# Patient Record
Sex: MALE | Race: BLACK OR AFRICAN AMERICAN | NOT HISPANIC OR LATINO | Employment: UNEMPLOYED | URBAN - METROPOLITAN AREA
[De-identification: names, ages, dates, MRNs, and addresses within clinical notes are randomized per-mention and may not be internally consistent; named-entity substitution may affect disease eponyms.]

---

## 2018-09-02 ENCOUNTER — HOSPITAL ENCOUNTER (EMERGENCY)
Age: 48
Discharge: HOME | End: 2018-09-02

## 2018-09-02 VITALS
SYSTOLIC BLOOD PRESSURE: 133 MMHG | TEMPERATURE: 98.3 F | WEIGHT: 145 LBS | DIASTOLIC BLOOD PRESSURE: 80 MMHG | OXYGEN SATURATION: 98 % | BODY MASS INDEX: 23.3 KG/M2 | HEIGHT: 66 IN | HEART RATE: 89 BPM | RESPIRATION RATE: 18 BRPM

## 2018-09-02 DIAGNOSIS — K08.89 PAIN, DENTAL: Primary | ICD-10-CM

## 2018-09-02 PROCEDURE — 99284 EMERGENCY DEPT VISIT MOD MDM: CPT | Performed by: NURSE PRACTITIONER

## 2018-09-02 PROCEDURE — 10004651 HB RX, NO CHARGE ITEM: Performed by: NURSE PRACTITIONER

## 2018-09-02 PROCEDURE — 99282 EMERGENCY DEPT VISIT SF MDM: CPT

## 2018-09-02 RX ORDER — PENICILLIN V POTASSIUM 500 MG/1
500 TABLET ORAL 4 TIMES DAILY
Qty: 28 TABLET | Refills: 0 | Status: SHIPPED | OUTPATIENT
Start: 2018-09-02 | End: 2018-09-09

## 2018-09-02 RX ORDER — ACETAMINOPHEN 500 MG
1000 TABLET ORAL ONCE
Status: COMPLETED | OUTPATIENT
Start: 2018-09-02 | End: 2018-09-02

## 2018-09-02 RX ADMIN — ACETAMINOPHEN 1000 MG: 500 TABLET ORAL at 14:10

## 2018-09-02 ASSESSMENT — PAIN SCALES - GENERAL: PAINLEVEL_OUTOF10: 8

## 2018-12-30 ENCOUNTER — HOSPITAL ENCOUNTER (EMERGENCY)
Age: 48
Discharge: HOME | End: 2018-12-30

## 2018-12-30 VITALS
SYSTOLIC BLOOD PRESSURE: 110 MMHG | HEART RATE: 96 BPM | TEMPERATURE: 98 F | DIASTOLIC BLOOD PRESSURE: 74 MMHG | RESPIRATION RATE: 14 BRPM

## 2018-12-30 DIAGNOSIS — H00.011 HORDEOLUM EXTERNUM OF RIGHT UPPER EYELID: Primary | ICD-10-CM

## 2018-12-30 PROCEDURE — 99283 EMERGENCY DEPT VISIT LOW MDM: CPT

## 2018-12-30 PROCEDURE — 99284 EMERGENCY DEPT VISIT MOD MDM: CPT | Performed by: PHYSICIAN ASSISTANT

## 2018-12-30 PROCEDURE — 10002803 HB RX 637: Performed by: PHYSICIAN ASSISTANT

## 2018-12-30 RX ORDER — ERYTHROMYCIN 5 MG/G
OINTMENT OPHTHALMIC ONCE
Status: COMPLETED | OUTPATIENT
Start: 2018-12-30 | End: 2018-12-30

## 2018-12-30 RX ORDER — ERYTHROMYCIN 5 MG/G
OINTMENT OPHTHALMIC
Qty: 3.5 G | Refills: 0 | Status: SHIPPED | OUTPATIENT
Start: 2018-12-30

## 2018-12-30 RX ORDER — IBUPROFEN 600 MG/1
600 TABLET ORAL ONCE
Status: COMPLETED | OUTPATIENT
Start: 2018-12-30 | End: 2018-12-30

## 2018-12-30 RX ORDER — IBUPROFEN 600 MG/1
600 TABLET ORAL EVERY 6 HOURS PRN
Qty: 30 TABLET | Refills: 0 | OUTPATIENT
Start: 2018-12-30 | End: 2020-02-04

## 2018-12-30 RX ADMIN — ERYTHROMYCIN: 5 OINTMENT OPHTHALMIC at 11:49

## 2018-12-30 RX ADMIN — IBUPROFEN 600 MG: 600 TABLET, FILM COATED ORAL at 11:49

## 2020-02-04 ENCOUNTER — APPOINTMENT (OUTPATIENT)
Dept: GENERAL RADIOLOGY | Age: 50
End: 2020-02-04

## 2020-02-04 ENCOUNTER — HOSPITAL ENCOUNTER (EMERGENCY)
Age: 50
Discharge: HOME | End: 2020-02-04

## 2020-02-04 VITALS
SYSTOLIC BLOOD PRESSURE: 130 MMHG | WEIGHT: 143.3 LBS | DIASTOLIC BLOOD PRESSURE: 90 MMHG | BODY MASS INDEX: 23.13 KG/M2 | OXYGEN SATURATION: 100 % | TEMPERATURE: 98.6 F | HEART RATE: 88 BPM | RESPIRATION RATE: 16 BRPM

## 2020-02-04 DIAGNOSIS — M54.6 BACK PAIN OF THORACOLUMBAR REGION: ICD-10-CM

## 2020-02-04 DIAGNOSIS — M54.50 BACK PAIN OF THORACOLUMBAR REGION: ICD-10-CM

## 2020-02-04 DIAGNOSIS — S22.31XA CLOSED FRACTURE OF ONE RIB OF RIGHT SIDE, INITIAL ENCOUNTER: Primary | ICD-10-CM

## 2020-02-04 PROCEDURE — 99284 EMERGENCY DEPT VISIT MOD MDM: CPT | Performed by: PHYSICIAN ASSISTANT

## 2020-02-04 PROCEDURE — 10002803 HB RX 637: Performed by: PHYSICIAN ASSISTANT

## 2020-02-04 PROCEDURE — 99283 EMERGENCY DEPT VISIT LOW MDM: CPT

## 2020-02-04 PROCEDURE — 71101 X-RAY EXAM UNILAT RIBS/CHEST: CPT

## 2020-02-04 PROCEDURE — 71101 X-RAY EXAM UNILAT RIBS/CHEST: CPT | Performed by: RADIOLOGY

## 2020-02-04 RX ORDER — LIDOCAINE 4 G/G
1 PATCH TOPICAL DAILY
Status: DISCONTINUED | OUTPATIENT
Start: 2020-02-04 | End: 2020-02-04 | Stop reason: HOSPADM

## 2020-02-04 RX ORDER — HYDROCODONE BITARTRATE AND ACETAMINOPHEN 5; 325 MG/1; MG/1
1 TABLET ORAL EVERY 6 HOURS PRN
Qty: 10 TABLET | Refills: 0 | Status: SHIPPED | OUTPATIENT
Start: 2020-02-04

## 2020-02-04 RX ORDER — IBUPROFEN 600 MG/1
600 TABLET ORAL EVERY 6 HOURS PRN
Qty: 20 TABLET | Refills: 0 | Status: SHIPPED | OUTPATIENT
Start: 2020-02-04

## 2020-02-04 RX ORDER — LIDOCAINE 50 MG/G
1 PATCH TOPICAL EVERY 24 HOURS
Qty: 15 PATCH | Refills: 0 | Status: SHIPPED | OUTPATIENT
Start: 2020-02-04

## 2020-02-04 RX ORDER — IBUPROFEN 600 MG/1
600 TABLET ORAL ONCE
Status: COMPLETED | OUTPATIENT
Start: 2020-02-04 | End: 2020-02-04

## 2020-02-04 RX ADMIN — IBUPROFEN 600 MG: 600 TABLET ORAL at 10:47

## 2020-02-04 RX ADMIN — LIDOCAINE 1 PATCH: 246 PATCH TOPICAL at 10:48

## 2020-02-04 ASSESSMENT — PAIN DESCRIPTION - PAIN TYPE
TYPE: ACUTE PAIN

## 2020-02-04 ASSESSMENT — PAIN SCALES - GENERAL
PAINLEVEL_OUTOF10: 10
PAINLEVEL_OUTOF10: 10
PAINLEVEL_OUTOF10: 8
PAINLEVEL_OUTOF10: 10

## 2020-02-21 ENCOUNTER — HOSPITAL ENCOUNTER (EMERGENCY)
Age: 50
Discharge: HOME | End: 2020-02-21
Attending: EMERGENCY MEDICINE

## 2020-02-21 VITALS
DIASTOLIC BLOOD PRESSURE: 93 MMHG | TEMPERATURE: 98.8 F | SYSTOLIC BLOOD PRESSURE: 128 MMHG | HEART RATE: 87 BPM | OXYGEN SATURATION: 99 % | RESPIRATION RATE: 16 BRPM

## 2020-02-21 DIAGNOSIS — K08.89 PAIN, DENTAL: Primary | ICD-10-CM

## 2020-02-21 PROCEDURE — 10002803 HB RX 637: Performed by: NURSE PRACTITIONER

## 2020-02-21 PROCEDURE — 99282 EMERGENCY DEPT VISIT SF MDM: CPT

## 2020-02-21 PROCEDURE — 99284 EMERGENCY DEPT VISIT MOD MDM: CPT | Performed by: NURSE PRACTITIONER

## 2020-02-21 RX ORDER — TRAMADOL HYDROCHLORIDE 50 MG/1
50 TABLET ORAL ONCE
Status: COMPLETED | OUTPATIENT
Start: 2020-02-21 | End: 2020-02-21

## 2020-02-21 RX ORDER — PENICILLIN V POTASSIUM 500 MG/1
500 TABLET ORAL 4 TIMES DAILY
Qty: 28 TABLET | Refills: 0 | Status: SHIPPED | OUTPATIENT
Start: 2020-02-21 | End: 2020-02-28

## 2020-02-21 RX ORDER — PENICILLIN V POTASSIUM 250 MG/1
500 TABLET ORAL ONCE
Status: COMPLETED | OUTPATIENT
Start: 2020-02-21 | End: 2020-02-21

## 2020-02-21 RX ADMIN — TRAMADOL HYDROCHLORIDE 50 MG: 50 TABLET, COATED ORAL at 02:34

## 2020-02-21 RX ADMIN — PENICILLIN V POTASSIUM 500 MG: 250 TABLET, FILM COATED ORAL at 02:34

## 2020-02-21 ASSESSMENT — PAIN DESCRIPTION - PAIN TYPE
TYPE: ACUTE PAIN
TYPE: ACUTE PAIN

## 2020-02-21 ASSESSMENT — PAIN SCALES - GENERAL
PAINLEVEL_OUTOF10: 10
PAINLEVEL_OUTOF10: 10

## 2021-07-02 ENCOUNTER — APPOINTMENT (OUTPATIENT)
Dept: GENERAL RADIOLOGY | Age: 51
End: 2021-07-02

## 2021-07-02 ENCOUNTER — HOSPITAL ENCOUNTER (EMERGENCY)
Age: 51
Discharge: HOME | End: 2021-07-02

## 2021-07-02 VITALS
SYSTOLIC BLOOD PRESSURE: 108 MMHG | DIASTOLIC BLOOD PRESSURE: 78 MMHG | TEMPERATURE: 97.9 F | OXYGEN SATURATION: 98 % | RESPIRATION RATE: 18 BRPM | HEART RATE: 91 BPM

## 2021-07-02 DIAGNOSIS — S80.02XA CONTUSION OF LEFT KNEE, INITIAL ENCOUNTER: Primary | ICD-10-CM

## 2021-07-02 PROCEDURE — 99284 EMERGENCY DEPT VISIT MOD MDM: CPT | Performed by: PHYSICIAN ASSISTANT

## 2021-07-02 PROCEDURE — 10002803 HB RX 637: Performed by: PHYSICIAN ASSISTANT

## 2021-07-02 PROCEDURE — 73562 X-RAY EXAM OF KNEE 3: CPT

## 2021-07-02 PROCEDURE — 73562 X-RAY EXAM OF KNEE 3: CPT | Performed by: RADIOLOGY

## 2021-07-02 PROCEDURE — 99283 EMERGENCY DEPT VISIT LOW MDM: CPT

## 2021-07-02 RX ORDER — IBUPROFEN 400 MG/1
800 TABLET ORAL ONCE
Status: COMPLETED | OUTPATIENT
Start: 2021-07-02 | End: 2021-07-02

## 2021-07-02 RX ADMIN — IBUPROFEN 800 MG: 400 TABLET ORAL at 19:03

## 2021-07-02 ASSESSMENT — PAIN SCALES - GENERAL: PAINLEVEL_OUTOF10: 9

## 2021-09-15 ENCOUNTER — ANESTHESIA (OUTPATIENT)
Dept: PERIOP | Facility: HOSPITAL | Age: 51
End: 2021-09-15

## 2021-09-15 ENCOUNTER — ANESTHESIA EVENT (OUTPATIENT)
Dept: PERIOP | Facility: HOSPITAL | Age: 51
End: 2021-09-15

## 2021-09-15 ENCOUNTER — APPOINTMENT (OUTPATIENT)
Dept: CT IMAGING | Facility: HOSPITAL | Age: 51
End: 2021-09-15

## 2021-09-15 ENCOUNTER — HOSPITAL ENCOUNTER (OUTPATIENT)
Facility: HOSPITAL | Age: 51
Discharge: HOME | End: 2021-09-17
Attending: EMERGENCY MEDICINE | Admitting: SURGERY

## 2021-09-15 DIAGNOSIS — K35.80 ACUTE APPENDICITIS, UNSPECIFIED ACUTE APPENDICITIS TYPE: Primary | ICD-10-CM

## 2021-09-15 DIAGNOSIS — K35.20 ACUTE APPENDICITIS WITH PERFORATION AND GENERALIZED PERITONITIS, WITHOUT ABSCESS OR GANGRENE: ICD-10-CM

## 2021-09-15 DIAGNOSIS — K35.33 ACUTE APPENDICITIS WITH APPENDICEAL ABSCESS: ICD-10-CM

## 2021-09-15 LAB
ALBUMIN SERPL-MCNC: 4.3 G/DL (ref 3.5–5.2)
ALBUMIN/GLOB SERPL: 1.1 G/DL
ALP SERPL-CCNC: 67 U/L (ref 39–117)
ALT SERPL W P-5'-P-CCNC: 15 U/L (ref 1–41)
ANION GAP SERPL CALCULATED.3IONS-SCNC: 14.6 MMOL/L (ref 5–15)
AST SERPL-CCNC: 14 U/L (ref 1–40)
BACTERIA UR QL AUTO: ABNORMAL /HPF
BASOPHILS # BLD AUTO: 0.04 10*3/MM3 (ref 0–0.2)
BASOPHILS NFR BLD AUTO: 0.2 % (ref 0–1.5)
BILIRUB SERPL-MCNC: 0.6 MG/DL (ref 0–1.2)
BILIRUB UR QL STRIP: ABNORMAL
BUN SERPL-MCNC: 15 MG/DL (ref 6–20)
BUN/CREAT SERPL: 13 (ref 7–25)
CALCIUM SPEC-SCNC: 9.6 MG/DL (ref 8.6–10.5)
CHLORIDE SERPL-SCNC: 102 MMOL/L (ref 98–107)
CLARITY UR: CLEAR
CO2 SERPL-SCNC: 23.4 MMOL/L (ref 22–29)
COLOR UR: YELLOW
CREAT SERPL-MCNC: 1.15 MG/DL (ref 0.76–1.27)
DEPRECATED RDW RBC AUTO: 42.8 FL (ref 37–54)
EOSINOPHIL # BLD AUTO: 0 10*3/MM3 (ref 0–0.4)
EOSINOPHIL NFR BLD AUTO: 0 % (ref 0.3–6.2)
ERYTHROCYTE [DISTWIDTH] IN BLOOD BY AUTOMATED COUNT: 12.6 % (ref 12.3–15.4)
GFR SERPL CREATININE-BSD FRML MDRD: 67 ML/MIN/1.73
GLOBULIN UR ELPH-MCNC: 4 GM/DL
GLUCOSE SERPL-MCNC: 84 MG/DL (ref 65–99)
GLUCOSE UR STRIP-MCNC: NEGATIVE MG/DL
HCT VFR BLD AUTO: 45.6 % (ref 37.5–51)
HGB BLD-MCNC: 14.4 G/DL (ref 13–17.7)
HGB UR QL STRIP.AUTO: ABNORMAL
HOLD SPECIMEN: NORMAL
HOLD SPECIMEN: NORMAL
HYALINE CASTS UR QL AUTO: ABNORMAL /LPF
IMM GRANULOCYTES # BLD AUTO: 0.09 10*3/MM3 (ref 0–0.05)
IMM GRANULOCYTES NFR BLD AUTO: 0.4 % (ref 0–0.5)
KETONES UR QL STRIP: ABNORMAL
LEUKOCYTE ESTERASE UR QL STRIP.AUTO: NEGATIVE
LIPASE SERPL-CCNC: 13 U/L (ref 13–60)
LYMPHOCYTES # BLD AUTO: 1.28 10*3/MM3 (ref 0.7–3.1)
LYMPHOCYTES NFR BLD AUTO: 6.3 % (ref 19.6–45.3)
MCH RBC QN AUTO: 29.2 PG (ref 26.6–33)
MCHC RBC AUTO-ENTMCNC: 31.6 G/DL (ref 31.5–35.7)
MCV RBC AUTO: 92.5 FL (ref 79–97)
MONOCYTES # BLD AUTO: 1.15 10*3/MM3 (ref 0.1–0.9)
MONOCYTES NFR BLD AUTO: 5.7 % (ref 5–12)
NEUTROPHILS NFR BLD AUTO: 17.78 10*3/MM3 (ref 1.7–7)
NEUTROPHILS NFR BLD AUTO: 87.4 % (ref 42.7–76)
NITRITE UR QL STRIP: NEGATIVE
NRBC BLD AUTO-RTO: 0 /100 WBC (ref 0–0.2)
PH UR STRIP.AUTO: 5.5 [PH] (ref 5–8)
PLATELET # BLD AUTO: 240 10*3/MM3 (ref 140–450)
PMV BLD AUTO: 10.2 FL (ref 6–12)
POTASSIUM SERPL-SCNC: 4.1 MMOL/L (ref 3.5–5.2)
PROT SERPL-MCNC: 8.3 G/DL (ref 6–8.5)
PROT UR QL STRIP: NEGATIVE
RBC # BLD AUTO: 4.93 10*6/MM3 (ref 4.14–5.8)
RBC # UR: ABNORMAL /HPF
REF LAB TEST METHOD: ABNORMAL
SODIUM SERPL-SCNC: 140 MMOL/L (ref 136–145)
SP GR UR STRIP: 1.01 (ref 1–1.03)
SQUAMOUS #/AREA URNS HPF: ABNORMAL /HPF
TROPONIN T SERPL-MCNC: <0.01 NG/ML (ref 0–0.03)
UROBILINOGEN UR QL STRIP: ABNORMAL
WBC # BLD AUTO: 20.34 10*3/MM3 (ref 3.4–10.8)
WBC UR QL AUTO: ABNORMAL /HPF
WHOLE BLOOD HOLD SPECIMEN: NORMAL
WHOLE BLOOD HOLD SPECIMEN: NORMAL

## 2021-09-15 PROCEDURE — 25010000002 HYDROMORPHONE PER 4 MG: Performed by: ANESTHESIOLOGY

## 2021-09-15 PROCEDURE — 99284 EMERGENCY DEPT VISIT MOD MDM: CPT

## 2021-09-15 PROCEDURE — C1889 IMPLANT/INSERT DEVICE, NOC: HCPCS | Performed by: SURGERY

## 2021-09-15 PROCEDURE — 25010000002 FENTANYL CITRATE (PF) 50 MCG/ML SOLUTION: Performed by: ANESTHESIOLOGY

## 2021-09-15 PROCEDURE — 25010000002 ONDANSETRON PER 1 MG: Performed by: ANESTHESIOLOGY

## 2021-09-15 PROCEDURE — 87635 SARS-COV-2 COVID-19 AMP PRB: CPT | Performed by: SURGERY

## 2021-09-15 PROCEDURE — 74177 CT ABD & PELVIS W/CONTRAST: CPT

## 2021-09-15 PROCEDURE — 25010000002 DEXAMETHASONE PER 1 MG: Performed by: ANESTHESIOLOGY

## 2021-09-15 PROCEDURE — 85025 COMPLETE CBC W/AUTO DIFF WBC: CPT | Performed by: EMERGENCY MEDICINE

## 2021-09-15 PROCEDURE — 44970 LAPAROSCOPY APPENDECTOMY: CPT | Performed by: SURGERY

## 2021-09-15 PROCEDURE — 25010000002 NEOSTIGMINE 10 MG/10ML SOLUTION: Performed by: ANESTHESIOLOGY

## 2021-09-15 PROCEDURE — C9803 HOPD COVID-19 SPEC COLLECT: HCPCS

## 2021-09-15 PROCEDURE — 99225 PR SBSQ OBSERVATION CARE/DAY 25 MINUTES: CPT | Performed by: SURGERY

## 2021-09-15 PROCEDURE — 83690 ASSAY OF LIPASE: CPT | Performed by: EMERGENCY MEDICINE

## 2021-09-15 PROCEDURE — 0 IOPAMIDOL PER 1 ML: Performed by: EMERGENCY MEDICINE

## 2021-09-15 PROCEDURE — 36415 COLL VENOUS BLD VENIPUNCTURE: CPT | Performed by: EMERGENCY MEDICINE

## 2021-09-15 PROCEDURE — G0378 HOSPITAL OBSERVATION PER HR: HCPCS

## 2021-09-15 PROCEDURE — 84484 ASSAY OF TROPONIN QUANT: CPT | Performed by: EMERGENCY MEDICINE

## 2021-09-15 PROCEDURE — 88304 TISSUE EXAM BY PATHOLOGIST: CPT | Performed by: SURGERY

## 2021-09-15 PROCEDURE — 25010000002 MIDAZOLAM PER 1MG: Performed by: ANESTHESIOLOGY

## 2021-09-15 PROCEDURE — 25010000002 PROPOFOL 10 MG/ML EMULSION: Performed by: ANESTHESIOLOGY

## 2021-09-15 PROCEDURE — 81001 URINALYSIS AUTO W/SCOPE: CPT | Performed by: EMERGENCY MEDICINE

## 2021-09-15 PROCEDURE — 80053 COMPREHEN METABOLIC PANEL: CPT | Performed by: EMERGENCY MEDICINE

## 2021-09-15 PROCEDURE — 25010000002 CEFOXITIN PER 1 G

## 2021-09-15 PROCEDURE — 25010000002 KETOROLAC TROMETHAMINE PER 15 MG: Performed by: ANESTHESIOLOGY

## 2021-09-15 DEVICE — ENDOPATH ETS45 2.5MM RELOADS (VASCULAR/THIN)
Type: IMPLANTABLE DEVICE | Site: ABDOMEN | Status: FUNCTIONAL
Brand: ENDOPATH

## 2021-09-15 RX ORDER — MIDAZOLAM HYDROCHLORIDE 2 MG/2ML
INJECTION, SOLUTION INTRAMUSCULAR; INTRAVENOUS AS NEEDED
Status: DISCONTINUED | OUTPATIENT
Start: 2021-09-15 | End: 2021-09-15 | Stop reason: SURG

## 2021-09-15 RX ORDER — ONDANSETRON 2 MG/ML
4 INJECTION INTRAMUSCULAR; INTRAVENOUS EVERY 6 HOURS PRN
Status: CANCELLED | OUTPATIENT
Start: 2021-09-15

## 2021-09-15 RX ORDER — GLYCOPYRROLATE 0.2 MG/ML
INJECTION INTRAMUSCULAR; INTRAVENOUS AS NEEDED
Status: DISCONTINUED | OUTPATIENT
Start: 2021-09-15 | End: 2021-09-15 | Stop reason: SURG

## 2021-09-15 RX ORDER — ONDANSETRON 2 MG/ML
4 INJECTION INTRAMUSCULAR; INTRAVENOUS ONCE AS NEEDED
Status: DISCONTINUED | OUTPATIENT
Start: 2021-09-15 | End: 2021-09-16 | Stop reason: HOSPADM

## 2021-09-15 RX ORDER — SODIUM CHLORIDE, SODIUM LACTATE, POTASSIUM CHLORIDE, CALCIUM CHLORIDE 600; 310; 30; 20 MG/100ML; MG/100ML; MG/100ML; MG/100ML
70 INJECTION, SOLUTION INTRAVENOUS CONTINUOUS
Status: DISCONTINUED | OUTPATIENT
Start: 2021-09-15 | End: 2021-09-16

## 2021-09-15 RX ORDER — PROMETHAZINE HYDROCHLORIDE 25 MG/1
25 SUPPOSITORY RECTAL ONCE AS NEEDED
Status: DISCONTINUED | OUTPATIENT
Start: 2021-09-15 | End: 2021-09-16 | Stop reason: HOSPADM

## 2021-09-15 RX ORDER — ACETAMINOPHEN 160 MG/5ML
650 SOLUTION ORAL ONCE
Status: DISCONTINUED | OUTPATIENT
Start: 2021-09-15 | End: 2021-09-15

## 2021-09-15 RX ORDER — SODIUM CHLORIDE, SODIUM LACTATE, POTASSIUM CHLORIDE, CALCIUM CHLORIDE 600; 310; 30; 20 MG/100ML; MG/100ML; MG/100ML; MG/100ML
9 INJECTION, SOLUTION INTRAVENOUS CONTINUOUS PRN
Status: DISCONTINUED | OUTPATIENT
Start: 2021-09-15 | End: 2021-09-16 | Stop reason: HOSPADM

## 2021-09-15 RX ORDER — DEXAMETHASONE SODIUM PHOSPHATE 4 MG/ML
INJECTION, SOLUTION INTRA-ARTICULAR; INTRALESIONAL; INTRAMUSCULAR; INTRAVENOUS; SOFT TISSUE AS NEEDED
Status: DISCONTINUED | OUTPATIENT
Start: 2021-09-15 | End: 2021-09-15 | Stop reason: SURG

## 2021-09-15 RX ORDER — LIDOCAINE HYDROCHLORIDE 20 MG/ML
INJECTION, SOLUTION INFILTRATION; PERINEURAL AS NEEDED
Status: DISCONTINUED | OUTPATIENT
Start: 2021-09-15 | End: 2021-09-15 | Stop reason: SURG

## 2021-09-15 RX ORDER — DICLOFENAC SODIUM 75 MG/1
50 TABLET, DELAYED RELEASE ORAL DAILY
COMMUNITY
End: 2021-09-27

## 2021-09-15 RX ORDER — MEPERIDINE HYDROCHLORIDE 25 MG/ML
12.5 INJECTION INTRAMUSCULAR; INTRAVENOUS; SUBCUTANEOUS
Status: DISCONTINUED | OUTPATIENT
Start: 2021-09-15 | End: 2021-09-16 | Stop reason: HOSPADM

## 2021-09-15 RX ORDER — SODIUM CHLORIDE 0.9 % (FLUSH) 0.9 %
10 SYRINGE (ML) INJECTION AS NEEDED
Status: DISCONTINUED | OUTPATIENT
Start: 2021-09-15 | End: 2021-09-15 | Stop reason: HOSPADM

## 2021-09-15 RX ORDER — PROPOFOL 10 MG/ML
VIAL (ML) INTRAVENOUS AS NEEDED
Status: DISCONTINUED | OUTPATIENT
Start: 2021-09-15 | End: 2021-09-15 | Stop reason: SURG

## 2021-09-15 RX ORDER — CLINDAMYCIN HYDROCHLORIDE 300 MG/1
300 CAPSULE ORAL 4 TIMES DAILY
Qty: 40 CAPSULE | Refills: 0 | Status: SHIPPED | OUTPATIENT
Start: 2021-09-15 | End: 2021-09-15

## 2021-09-15 RX ORDER — FENTANYL CITRATE 50 UG/ML
INJECTION, SOLUTION INTRAMUSCULAR; INTRAVENOUS AS NEEDED
Status: DISCONTINUED | OUTPATIENT
Start: 2021-09-15 | End: 2021-09-15 | Stop reason: SURG

## 2021-09-15 RX ORDER — SODIUM CHLORIDE 0.9 % (FLUSH) 0.9 %
10 SYRINGE (ML) INJECTION EVERY 12 HOURS SCHEDULED
Status: DISCONTINUED | OUTPATIENT
Start: 2021-09-15 | End: 2021-09-15 | Stop reason: HOSPADM

## 2021-09-15 RX ORDER — OXYCODONE HYDROCHLORIDE 5 MG/1
5 TABLET ORAL
Status: DISCONTINUED | OUTPATIENT
Start: 2021-09-15 | End: 2021-09-16 | Stop reason: HOSPADM

## 2021-09-15 RX ORDER — SODIUM CHLORIDE 0.9 % (FLUSH) 0.9 %
10 SYRINGE (ML) INJECTION AS NEEDED
Status: DISCONTINUED | OUTPATIENT
Start: 2021-09-15 | End: 2021-09-16

## 2021-09-15 RX ORDER — DICLOFENAC SODIUM 75 MG/1
75 TABLET, DELAYED RELEASE ORAL 2 TIMES DAILY
Qty: 20 TABLET | Refills: 0 | Status: SHIPPED | OUTPATIENT
Start: 2021-09-15 | End: 2021-09-15

## 2021-09-15 RX ORDER — ONDANSETRON 2 MG/ML
INJECTION INTRAMUSCULAR; INTRAVENOUS AS NEEDED
Status: DISCONTINUED | OUTPATIENT
Start: 2021-09-15 | End: 2021-09-15 | Stop reason: SURG

## 2021-09-15 RX ORDER — NEOSTIGMINE METHYLSULFATE 1 MG/ML
INJECTION, SOLUTION INTRAVENOUS AS NEEDED
Status: DISCONTINUED | OUTPATIENT
Start: 2021-09-15 | End: 2021-09-15 | Stop reason: SURG

## 2021-09-15 RX ORDER — BUPIVACAINE HYDROCHLORIDE AND EPINEPHRINE 2.5; 5 MG/ML; UG/ML
INJECTION, SOLUTION EPIDURAL; INFILTRATION; INTRACAUDAL; PERINEURAL AS NEEDED
Status: DISCONTINUED | OUTPATIENT
Start: 2021-09-15 | End: 2021-09-15 | Stop reason: HOSPADM

## 2021-09-15 RX ORDER — MAGNESIUM HYDROXIDE 1200 MG/15ML
LIQUID ORAL AS NEEDED
Status: DISCONTINUED | OUTPATIENT
Start: 2021-09-15 | End: 2021-09-15 | Stop reason: HOSPADM

## 2021-09-15 RX ORDER — KETOROLAC TROMETHAMINE 30 MG/ML
INJECTION, SOLUTION INTRAMUSCULAR; INTRAVENOUS AS NEEDED
Status: DISCONTINUED | OUTPATIENT
Start: 2021-09-15 | End: 2021-09-15 | Stop reason: SURG

## 2021-09-15 RX ORDER — PROMETHAZINE HYDROCHLORIDE 12.5 MG/1
25 TABLET ORAL ONCE AS NEEDED
Status: DISCONTINUED | OUTPATIENT
Start: 2021-09-15 | End: 2021-09-16 | Stop reason: HOSPADM

## 2021-09-15 RX ORDER — ROCURONIUM BROMIDE 10 MG/ML
INJECTION, SOLUTION INTRAVENOUS AS NEEDED
Status: DISCONTINUED | OUTPATIENT
Start: 2021-09-15 | End: 2021-09-15 | Stop reason: SURG

## 2021-09-15 RX ORDER — HYDROMORPHONE HCL 110MG/55ML
PATIENT CONTROLLED ANALGESIA SYRINGE INTRAVENOUS AS NEEDED
Status: DISCONTINUED | OUTPATIENT
Start: 2021-09-15 | End: 2021-09-15 | Stop reason: SURG

## 2021-09-15 RX ORDER — LIDOCAINE HYDROCHLORIDE 20 MG/ML
5 SOLUTION OROPHARYNGEAL
Status: DISCONTINUED | OUTPATIENT
Start: 2021-09-15 | End: 2021-09-15

## 2021-09-15 RX ADMIN — GLYCOPYRROLATE 0.6 MG: 0.2 INJECTION INTRAMUSCULAR; INTRAVENOUS at 22:57

## 2021-09-15 RX ADMIN — Medication 2 G: at 22:05

## 2021-09-15 RX ADMIN — HYDROMORPHONE HYDROCHLORIDE 1 MG: 2 INJECTION, SOLUTION INTRAMUSCULAR; INTRAVENOUS; SUBCUTANEOUS at 22:18

## 2021-09-15 RX ADMIN — PROPOFOL 150 MG: 10 INJECTION, EMULSION INTRAVENOUS at 21:56

## 2021-09-15 RX ADMIN — SODIUM CHLORIDE 1000 ML: 9 INJECTION, SOLUTION INTRAVENOUS at 18:34

## 2021-09-15 RX ADMIN — HYDROMORPHONE HYDROCHLORIDE 1 MG: 2 INJECTION, SOLUTION INTRAMUSCULAR; INTRAVENOUS; SUBCUTANEOUS at 22:30

## 2021-09-15 RX ADMIN — MIDAZOLAM HYDROCHLORIDE 2 MG: 1 INJECTION, SOLUTION INTRAMUSCULAR; INTRAVENOUS at 21:51

## 2021-09-15 RX ADMIN — SODIUM CHLORIDE, POTASSIUM CHLORIDE, SODIUM LACTATE AND CALCIUM CHLORIDE: 600; 310; 30; 20 INJECTION, SOLUTION INTRAVENOUS at 21:51

## 2021-09-15 RX ADMIN — FENTANYL CITRATE 100 MCG: 50 INJECTION INTRAMUSCULAR; INTRAVENOUS at 21:51

## 2021-09-15 RX ADMIN — ONDANSETRON 4 MG: 2 INJECTION INTRAMUSCULAR; INTRAVENOUS at 22:57

## 2021-09-15 RX ADMIN — NEOSTIGMINE METHYLSULFATE 4 MG: 1 INJECTION, SOLUTION INTRAVENOUS at 22:57

## 2021-09-15 RX ADMIN — SODIUM CHLORIDE, POTASSIUM CHLORIDE, SODIUM LACTATE AND CALCIUM CHLORIDE: 600; 310; 30; 20 INJECTION, SOLUTION INTRAVENOUS at 22:50

## 2021-09-15 RX ADMIN — ROCURONIUM BROMIDE 50 MG: 10 INJECTION INTRAVENOUS at 21:56

## 2021-09-15 RX ADMIN — LIDOCAINE HYDROCHLORIDE 100 MG: 20 INJECTION, SOLUTION INFILTRATION; PERINEURAL at 21:56

## 2021-09-15 RX ADMIN — IOPAMIDOL 100 ML: 755 INJECTION, SOLUTION INTRAVENOUS at 19:02

## 2021-09-15 RX ADMIN — DEXAMETHASONE SODIUM PHOSPHATE 4 MG: 4 INJECTION INTRA-ARTICULAR; INTRALESIONAL; INTRAMUSCULAR; INTRAVENOUS; SOFT TISSUE at 22:10

## 2021-09-15 RX ADMIN — KETOROLAC TROMETHAMINE 30 MG: 30 INJECTION, SOLUTION INTRAMUSCULAR; INTRAVENOUS at 22:49

## 2021-09-15 NOTE — ED PROVIDER NOTES
Subjective   Dental pain      History provided by:  Patient   used: No        Review of Systems    No past medical history on file.    No Known Allergies    Past Surgical History:   Procedure Laterality Date   • SHOULDER ACROMIOPLASTY WITH ROTATOR CUFF REPAIR Bilateral        Family History   Problem Relation Age of Onset   • Leukemia Mother        Social History     Socioeconomic History   • Marital status:      Spouse name: Not on file   • Number of children: Not on file   • Years of education: Not on file   • Highest education level: Not on file   Tobacco Use   • Smoking status: Never Smoker   • Smokeless tobacco: Never Used   Substance and Sexual Activity   • Alcohol use: Yes     Comment: daily   • Drug use: Never           Objective   Physical Exam    Procedures           ED Course                                           MDM    Final diagnoses:   None       ED Disposition  ED Disposition     None          No follow-up provider specified.       Medication List      No changes were made to your prescriptions during this visit.

## 2021-09-15 NOTE — ED PROVIDER NOTES
Subjective   Pt reports RUQ pain that started Tuesday morning, reports vomiting x 1, none since. Pain has persisted and is worsening. He reports pain is more severe when in supine position and standing, reports some improvement when reclined at approx 45 degree angle in bed or chair. Pain radiates to RLQ and mid abdomen.       History provided by:  Patient  Abdominal Pain  Pain location:  RUQ  Pain quality: sharp    Pain radiates to:  Periumbilical region and RLQ  Pain severity:  Severe  Onset quality:  Sudden  Duration:  30 hours  Timing:  Constant  Progression:  Worsening  Chronicity:  New  Context: not alcohol use, not awakening from sleep, not diet changes, not eating, not laxative use, not medication withdrawal, not previous surgeries, not recent illness, not suspicious food intake and not trauma    Worsened by:  Palpation, movement and position changes  Ineffective treatments:  Position changes and not moving  Associated symptoms: anorexia    Associated symptoms: no belching, no chest pain, no chills, no constipation, no cough, no diarrhea, no dysuria, no fatigue, no fever, no flatus, no hematemesis, no hematochezia, no hematuria, no melena, no nausea, no shortness of breath, no sore throat and no vomiting        Review of Systems   Constitutional: Negative for chills, fatigue and fever.   HENT: Negative for congestion, ear pain and sore throat.    Eyes: Negative for pain.   Respiratory: Negative for cough, chest tightness and shortness of breath.    Cardiovascular: Negative for chest pain.   Gastrointestinal: Positive for abdominal pain and anorexia. Negative for constipation, diarrhea, flatus, hematemesis, hematochezia, melena, nausea and vomiting.   Genitourinary: Negative for dysuria, flank pain and hematuria.   Musculoskeletal: Negative for joint swelling.   Skin: Negative for pallor.   Neurological: Negative for seizures and headaches.   All other systems reviewed and are negative.      History reviewed.  No pertinent past medical history.    No Known Allergies    Past Surgical History:   Procedure Laterality Date   • BACK SURGERY     • KIDNEY SURGERY     • SHOULDER ACROMIOPLASTY WITH ROTATOR CUFF REPAIR Bilateral        Family History   Problem Relation Age of Onset   • Leukemia Mother        Social History     Socioeconomic History   • Marital status:      Spouse name: Not on file   • Number of children: Not on file   • Years of education: Not on file   • Highest education level: Not on file   Tobacco Use   • Smoking status: Current Every Day Smoker     Packs/day: 1.00     Years: 37.00     Pack years: 37.00     Types: Cigarettes   • Smokeless tobacco: Never Used   Substance and Sexual Activity   • Alcohol use: Not Currently   • Drug use: Not Currently           Objective   Physical Exam  Vitals and nursing note reviewed.   Constitutional:       General: He is not in acute distress.     Appearance: Normal appearance. He is not toxic-appearing.   HENT:      Head: Normocephalic and atraumatic.      Mouth/Throat:      Mouth: Mucous membranes are moist.   Eyes:      Extraocular Movements: Extraocular movements intact.      Pupils: Pupils are equal, round, and reactive to light.   Cardiovascular:      Rate and Rhythm: Normal rate and regular rhythm.      Pulses: Normal pulses.      Heart sounds: Normal heart sounds.   Pulmonary:      Effort: Pulmonary effort is normal. No respiratory distress.      Breath sounds: Normal breath sounds.   Abdominal:      General: Abdomen is flat.      Palpations: Abdomen is soft.      Tenderness: There is abdominal tenderness in the right upper quadrant, right lower quadrant and periumbilical area.   Musculoskeletal:         General: Normal range of motion.      Cervical back: Normal range of motion and neck supple.   Skin:     General: Skin is warm and dry.   Neurological:      Mental Status: He is alert and oriented to person, place, and time. Mental status is at baseline.          Procedures           ED Course                                           MDM  Number of Diagnoses or Management Options  Acute appendicitis, unspecified acute appendicitis type: new and requires workup     Amount and/or Complexity of Data Reviewed  Clinical lab tests: reviewed and ordered  Tests in the radiology section of CPT®: reviewed and ordered  Discuss the patient with other providers: yes (Dr. Avila-surgery-will see patient in the ER and take care of it (surgery))    Risk of Complications, Morbidity, and/or Mortality  Presenting problems: low  Diagnostic procedures: low  Management options: moderate    Patient Progress  Patient progress: stable      Final diagnoses:   Acute appendicitis, unspecified acute appendicitis type       ED Disposition  ED Disposition     ED Disposition Condition Comment    Decision to Admit  Level of Care: Med/Surg [1]   Diagnosis: Acute appendicitis, unspecified acute appendicitis type [3883144]   Admitting Physician: BUZZ AVILA [[id number]]   Attending Physician: BUZZ AVILA [[id number]]            No follow-up provider specified.       Medication List      ASK your doctor about these medications    diclofenac 50 MG EC tablet  Commonly known as: VOLTAREN  Ask about: Which instructions should I use?             Todd Toscano, APRN  09/16/21 0007

## 2021-09-16 LAB
ANION GAP SERPL CALCULATED.3IONS-SCNC: 14.1 MMOL/L (ref 5–15)
BASOPHILS # BLD AUTO: 0.02 10*3/MM3 (ref 0–0.2)
BASOPHILS NFR BLD AUTO: 0.1 % (ref 0–1.5)
BUN SERPL-MCNC: 15 MG/DL (ref 6–20)
BUN/CREAT SERPL: 13.6 (ref 7–25)
CALCIUM SPEC-SCNC: 9 MG/DL (ref 8.6–10.5)
CHLORIDE SERPL-SCNC: 98 MMOL/L (ref 98–107)
CO2 SERPL-SCNC: 20.9 MMOL/L (ref 22–29)
CREAT SERPL-MCNC: 1.1 MG/DL (ref 0.76–1.27)
DEPRECATED RDW RBC AUTO: 43.1 FL (ref 37–54)
EOSINOPHIL # BLD AUTO: 0 10*3/MM3 (ref 0–0.4)
EOSINOPHIL NFR BLD AUTO: 0 % (ref 0.3–6.2)
ERYTHROCYTE [DISTWIDTH] IN BLOOD BY AUTOMATED COUNT: 12.8 % (ref 12.3–15.4)
GFR SERPL CREATININE-BSD FRML MDRD: 71 ML/MIN/1.73
GLUCOSE SERPL-MCNC: 139 MG/DL (ref 65–99)
HCT VFR BLD AUTO: 41.5 % (ref 37.5–51)
HGB BLD-MCNC: 13.4 G/DL (ref 13–17.7)
IMM GRANULOCYTES # BLD AUTO: 0.1 10*3/MM3 (ref 0–0.05)
IMM GRANULOCYTES NFR BLD AUTO: 0.5 % (ref 0–0.5)
LYMPHOCYTES # BLD AUTO: 0.92 10*3/MM3 (ref 0.7–3.1)
LYMPHOCYTES NFR BLD AUTO: 4.9 % (ref 19.6–45.3)
MCH RBC QN AUTO: 29.6 PG (ref 26.6–33)
MCHC RBC AUTO-ENTMCNC: 32.3 G/DL (ref 31.5–35.7)
MCV RBC AUTO: 91.6 FL (ref 79–97)
MONOCYTES # BLD AUTO: 0.74 10*3/MM3 (ref 0.1–0.9)
MONOCYTES NFR BLD AUTO: 3.9 % (ref 5–12)
NEUTROPHILS NFR BLD AUTO: 17.04 10*3/MM3 (ref 1.7–7)
NEUTROPHILS NFR BLD AUTO: 90.6 % (ref 42.7–76)
NRBC BLD AUTO-RTO: 0 /100 WBC (ref 0–0.2)
PLATELET # BLD AUTO: 218 10*3/MM3 (ref 140–450)
PMV BLD AUTO: 10.1 FL (ref 6–12)
POTASSIUM SERPL-SCNC: 4.4 MMOL/L (ref 3.5–5.2)
RBC # BLD AUTO: 4.53 10*6/MM3 (ref 4.14–5.8)
SARS-COV-2 N GENE RESP QL NAA+PROBE: NOT DETECTED
SODIUM SERPL-SCNC: 133 MMOL/L (ref 136–145)
WBC # BLD AUTO: 18.82 10*3/MM3 (ref 3.4–10.8)

## 2021-09-16 PROCEDURE — G0378 HOSPITAL OBSERVATION PER HR: HCPCS

## 2021-09-16 PROCEDURE — 25010000002 HEPARIN (PORCINE) PER 1000 UNITS: Performed by: SURGERY

## 2021-09-16 PROCEDURE — 85025 COMPLETE CBC W/AUTO DIFF WBC: CPT | Performed by: SURGERY

## 2021-09-16 PROCEDURE — 25010000002 PIPERACILLIN SOD-TAZOBACTAM PER 1 G: Performed by: SURGERY

## 2021-09-16 PROCEDURE — 80048 BASIC METABOLIC PNL TOTAL CA: CPT | Performed by: SURGERY

## 2021-09-16 RX ORDER — ACETAMINOPHEN 325 MG/1
650 TABLET ORAL EVERY 4 HOURS PRN
Status: DISCONTINUED | OUTPATIENT
Start: 2021-09-16 | End: 2021-09-17 | Stop reason: HOSPADM

## 2021-09-16 RX ORDER — HEPARIN SODIUM 5000 [USP'U]/ML
5000 INJECTION, SOLUTION INTRAVENOUS; SUBCUTANEOUS EVERY 12 HOURS SCHEDULED
Status: DISCONTINUED | OUTPATIENT
Start: 2021-09-16 | End: 2021-09-17 | Stop reason: HOSPADM

## 2021-09-16 RX ORDER — SODIUM CHLORIDE 0.9 % (FLUSH) 0.9 %
10 SYRINGE (ML) INJECTION AS NEEDED
Status: DISCONTINUED | OUTPATIENT
Start: 2021-09-16 | End: 2021-09-17 | Stop reason: HOSPADM

## 2021-09-16 RX ORDER — HYDROCODONE BITARTRATE AND ACETAMINOPHEN 5; 325 MG/1; MG/1
1 TABLET ORAL EVERY 4 HOURS PRN
Status: DISCONTINUED | OUTPATIENT
Start: 2021-09-16 | End: 2021-09-17 | Stop reason: HOSPADM

## 2021-09-16 RX ORDER — SODIUM CHLORIDE 0.9 % (FLUSH) 0.9 %
3 SYRINGE (ML) INJECTION EVERY 12 HOURS SCHEDULED
Status: DISCONTINUED | OUTPATIENT
Start: 2021-09-16 | End: 2021-09-17 | Stop reason: HOSPADM

## 2021-09-16 RX ORDER — ONDANSETRON 2 MG/ML
4 INJECTION INTRAMUSCULAR; INTRAVENOUS EVERY 6 HOURS PRN
Status: DISCONTINUED | OUTPATIENT
Start: 2021-09-16 | End: 2021-09-17 | Stop reason: HOSPADM

## 2021-09-16 RX ORDER — NALOXONE HCL 0.4 MG/ML
0.4 VIAL (ML) INJECTION
Status: DISCONTINUED | OUTPATIENT
Start: 2021-09-16 | End: 2021-09-17 | Stop reason: HOSPADM

## 2021-09-16 RX ORDER — ONDANSETRON 4 MG/1
4 TABLET, FILM COATED ORAL EVERY 6 HOURS PRN
Status: DISCONTINUED | OUTPATIENT
Start: 2021-09-16 | End: 2021-09-17 | Stop reason: HOSPADM

## 2021-09-16 RX ORDER — SODIUM CHLORIDE 9 MG/ML
100 INJECTION, SOLUTION INTRAVENOUS CONTINUOUS
Status: DISCONTINUED | OUTPATIENT
Start: 2021-09-16 | End: 2021-09-17 | Stop reason: HOSPADM

## 2021-09-16 RX ORDER — KETOROLAC TROMETHAMINE 15 MG/ML
15 INJECTION, SOLUTION INTRAMUSCULAR; INTRAVENOUS EVERY 6 HOURS PRN
Status: DISCONTINUED | OUTPATIENT
Start: 2021-09-16 | End: 2021-09-17 | Stop reason: HOSPADM

## 2021-09-16 RX ORDER — DOCUSATE SODIUM 100 MG/1
100 CAPSULE, LIQUID FILLED ORAL 2 TIMES DAILY PRN
Status: DISCONTINUED | OUTPATIENT
Start: 2021-09-16 | End: 2021-09-17 | Stop reason: HOSPADM

## 2021-09-16 RX ORDER — ACETAMINOPHEN 650 MG/1
650 SUPPOSITORY RECTAL EVERY 4 HOURS PRN
Status: DISCONTINUED | OUTPATIENT
Start: 2021-09-16 | End: 2021-09-17 | Stop reason: HOSPADM

## 2021-09-16 RX ADMIN — TAZOBACTAM SODIUM AND PIPERACILLIN SODIUM 3.38 G: 375; 3 INJECTION, SOLUTION INTRAVENOUS at 05:12

## 2021-09-16 RX ADMIN — SODIUM CHLORIDE 100 ML/HR: 9 INJECTION, SOLUTION INTRAVENOUS at 00:50

## 2021-09-16 RX ADMIN — HEPARIN SODIUM 5000 UNITS: 5000 INJECTION, SOLUTION INTRAVENOUS; SUBCUTANEOUS at 00:49

## 2021-09-16 RX ADMIN — SODIUM CHLORIDE 100 ML/HR: 9 INJECTION, SOLUTION INTRAVENOUS at 23:33

## 2021-09-16 RX ADMIN — SODIUM CHLORIDE, PRESERVATIVE FREE 3 ML: 5 INJECTION INTRAVENOUS at 00:50

## 2021-09-16 RX ADMIN — HEPARIN SODIUM 5000 UNITS: 5000 INJECTION, SOLUTION INTRAVENOUS; SUBCUTANEOUS at 08:34

## 2021-09-16 RX ADMIN — TAZOBACTAM SODIUM AND PIPERACILLIN SODIUM 3.38 G: 375; 3 INJECTION, SOLUTION INTRAVENOUS at 00:49

## 2021-09-16 RX ADMIN — HEPARIN SODIUM 5000 UNITS: 5000 INJECTION, SOLUTION INTRAVENOUS; SUBCUTANEOUS at 21:15

## 2021-09-16 RX ADMIN — TAZOBACTAM SODIUM AND PIPERACILLIN SODIUM 3.38 G: 375; 3 INJECTION, SOLUTION INTRAVENOUS at 13:54

## 2021-09-16 RX ADMIN — SODIUM CHLORIDE 100 ML/HR: 9 INJECTION, SOLUTION INTRAVENOUS at 13:54

## 2021-09-16 RX ADMIN — HYDROCODONE BITARTRATE AND ACETAMINOPHEN 1 TABLET: 5; 325 TABLET ORAL at 21:14

## 2021-09-16 RX ADMIN — TAZOBACTAM SODIUM AND PIPERACILLIN SODIUM 3.38 G: 375; 3 INJECTION, SOLUTION INTRAVENOUS at 21:15

## 2021-09-16 RX ADMIN — SODIUM CHLORIDE, PRESERVATIVE FREE 10 ML: 5 INJECTION INTRAVENOUS at 00:50

## 2021-09-16 NOTE — H&P
[facility]   HISTORY AND PHYSICAL    Patient Name: Noel Mueller  : 1970  MRN: [id number]  Primary Care Physician:  Provider, No Known  Date of admission: 9/15/2021    Subjective   Subjective     Chief Complaint: Acute appendicitis    HPI:    Noel Mueller is a 50 y.o. male who presents with a 36-hour history of progressive right-sided abdominal pain.  He had a CT scan done through the emergency room this evening that revealed evidence of acute appendicitis with a possible periappendiceal abscess.    Review of Systems   Constitutional: Positive for fever.   Respiratory: Negative for shortness of breath.    Cardiovascular: Negative for chest pain.   Gastrointestinal: Positive for abdominal pain.       Personal History     History reviewed. No pertinent past medical history.    Past Surgical History:   Procedure Laterality Date   • BACK SURGERY     • KIDNEY SURGERY     • SHOULDER ACROMIOPLASTY WITH ROTATOR CUFF REPAIR Bilateral        Family History: family history includes Leukemia in his mother. Otherwise pertinent FHx was reviewed and not pertinent to current issue.    Social History:  reports that he has been smoking cigarettes. He has a 37.00 pack-year smoking history. He has never used smokeless tobacco. He reports previous alcohol use. He reports previous drug use.    Home Medications:  diclofenac    Allergies:  No Known Allergies    Objective    Objective     Vitals:   Temp:  [100.2 °F (37.9 °C)-100.3 °F (37.9 °C)] 100.2 °F (37.9 °C)  Heart Rate:  [74-81] 78  Resp:  [18-20] 18  BP: (125-136)/(79-84) 136/79    Physical Exam  Constitutional:       Appearance: Normal appearance.   HENT:      Head: Normocephalic.   Cardiovascular:      Rate and Rhythm: Normal rate.   Pulmonary:      Effort: Pulmonary effort is normal.   Abdominal:      Tenderness: There is abdominal tenderness in the right lower quadrant.   Musculoskeletal:         General: Normal range of motion.      Cervical back: Normal range  of motion.   Skin:     General: Skin is warm.   Neurological:      General: No focal deficit present.   Psychiatric:         Behavior: Behavior normal.         Result Review    Result Review:  I have personally reviewed the results from the time of this admission to 9/15/2021 21:10 EDT and agree with these findings:  [x]  Laboratory  []  Microbiology  [x]  Radiology  []  EKG/Telemetry   []  Cardiology/Vascular   []  Pathology  []  Old records  []  Other:  Most notable findings include: White blood cell count of 20,000    Assessment/Plan   Assessment / Plan     Brief Patient Summary:  Noel Mueller is a 50 y.o. male who has acute appendicitis with possible perforation    Active Hospital Problems:  Active Hospital Problems    Diagnosis    • Acute appendicitis with appendiceal abscess        Plan:   We will proceed with a laparoscopic appendectomy.  I have described the risk benefits and alternatives to him and he agrees to proceed.    DVT prophylaxis:  No DVT prophylaxis order currently exists.     CODE STATUS:         Admission Status:  I believe this patient meets observation status.    Electronically signed by Steven Méndez MD, 09/15/21, 9:07 PM EDT.

## 2021-09-16 NOTE — ED NOTES
Patient AAOx4, denies any needs at this time. Urinal provided by ED tech for urine specimen, call light in reach     Dara Vincent RN  09/15/21 2034

## 2021-09-16 NOTE — ED NOTES
Report given to OR dora GOTTI, OR here to get patient and advised they would hang fluids and antibiotics     Dara Vincent RN  09/15/21 [id number]

## 2021-09-16 NOTE — PROGRESS NOTES
[facility]     Progress Note    Patient Name: Noel Mueller  : 1970  MRN: [id number]    Date of admission: 9/15/2021    Subjective   Subjective     Patient feels okay today. Some abdominal soreness.  VSS.  Afebrile.     Objective   Objective     Review of Systems:    A 10 point review of systems was performed and all are negative except for what is documented in the HPI.     Vitals:   Temp:  [98 °F (36.7 °C)-100.3 °F (37.9 °C)] 98 °F (36.7 °C)  Heart Rate:  [51-81] 51  Resp:  [16-22] 16  BP: (106-136)/(56-84) 111/74  Flow (L/min):  [2] 2  Physical Exam    Constitutional: Awake, alert   Eyes: PERRLA    Neck: Supple, trachea midline   Respiratory: respirations even and unlabored   Cardiovascular: RRR   Gastrointestinal: Soft, approp TTP, ANIYAH with serosang output   Psychiatric: Appropriate affect, cooperative   Neurologic: Oriented x 3, speech clear   Skin: No rashes     Result Review    Result Review:  I have personally reviewed the results from the time of this admission to 2021 08:19 EDT and agree with these findings:  []  Laboratory  []  Microbiology  []  Radiology  []  EKG/Telemetry   []  Cardiology/Vascular   []  Pathology  []  Old records  []  Other:      Assessment/Plan   Assessment / Plan     Diagnosis    Acute appendicitis with appendiceal abscess  S/P lap appy    Active Hospital Problems:  Active Hospital Problems    Diagnosis    • Acute appendicitis with appendiceal abscess    • Acute appendicitis        Plan:      Cont current care       DVT prophylaxis:  Medical and mechanical DVT prophylaxis orders are present.          This document has been electronically signed by ANNETTA Hollins  2021 08:19 EDT

## 2021-09-16 NOTE — ANESTHESIA POSTPROCEDURE EVALUATION
Patient: Noel Mueller    Procedure Summary     Date: 09/15/21 Room / Location: [facility] OR 05 / [facility] MAIN OR    Anesthesia Start: 2151 Anesthesia Stop: 2316    Procedure: APPENDECTOMY LAPAROSCOPIC (N/A Abdomen) Diagnosis:       Acute appendicitis with appendiceal abscess      (Acute appendicitis with appendiceal abscess [K35.33])    Surgeons: Steven Méndez MD Provider: Reji May MD    Anesthesia Type: general ASA Status: 2 - Emergent          Anesthesia Type: general    Vitals  Vitals Value Taken Time   /62 09/15/21 2330   Temp 36.8 °C (98.2 °F) 09/15/21 2313   Pulse 75 09/15/21 2330   Resp 20 09/15/21 2328   SpO2 93 % 09/15/21 2330   Vitals shown include unvalidated device data.        Post Anesthesia Care and Evaluation    Patient location during evaluation: bedside  Patient participation: complete - patient participated  Level of consciousness: awake  Pain management: adequate  Airway patency: patent  Anesthetic complications: No anesthetic complications  PONV Status: none  Cardiovascular status: acceptable and stable  Respiratory status: acceptable  Hydration status: acceptable    Comments: An Anesthesiologist personally participated in the most demanding procedures (including induction and emergence if applicable) in the anesthesia plan, monitored the course of anesthesia administration at frequent intervals and remained physically present and available for immediate diagnosis and treatment of emergencies.

## 2021-09-16 NOTE — PLAN OF CARE
Goal Outcome Evaluation:           Progress: improving  Outcome Summary: Pt denies pain medication and states pain is very minimal. 20ml serosanguinous drainage from ANIYAH today. Pt has been walking around unit  most of the day. Continues to get IV antibiotics and fluids. Angelica Correa RN

## 2021-09-16 NOTE — ANESTHESIA PREPROCEDURE EVALUATION
Anesthesia Evaluation     Patient summary reviewed and Nursing notes reviewed   no history of anesthetic complications:  NPO Solid Status: > 8 hours  NPO Liquid Status: > 2 hours           Airway   Mallampati: I  TM distance: >3 FB  Neck ROM: full  No difficulty expected  Dental      Pulmonary - normal exam    breath sounds clear to auscultation  (+) a smoker Current,   Cardiovascular - negative cardio ROS and normal exam  Exercise tolerance: excellent (>7 METS)    Rhythm: regular        Neuro/Psych- negative ROS  GI/Hepatic/Renal/Endo      Musculoskeletal     Abdominal    Substance History - negative use     OB/GYN          Other                        Anesthesia Plan    ASA 2 - emergent     general       Anesthetic plan, all risks, benefits, and alternatives have been provided, discussed and informed consent has been obtained with: patient.

## 2021-09-16 NOTE — PLAN OF CARE
Goal Outcome Evaluation:  Plan of Care Reviewed With: patient           Outcome Summary: PT IS IN MILD PAIN. NO BLOOD ON LAP SITES, ANIYAH DRAIN IN PLACE. PT AWARE OF DIAGNOSIS Cedric Cornell RN

## 2021-09-17 ENCOUNTER — READMISSION MANAGEMENT (OUTPATIENT)
Dept: CALL CENTER | Facility: HOSPITAL | Age: 51
End: 2021-09-17

## 2021-09-17 VITALS
RESPIRATION RATE: 16 BRPM | DIASTOLIC BLOOD PRESSURE: 83 MMHG | OXYGEN SATURATION: 100 % | TEMPERATURE: 97.7 F | SYSTOLIC BLOOD PRESSURE: 123 MMHG | WEIGHT: 165.57 LBS | HEART RATE: 58 BPM | BODY MASS INDEX: 27.58 KG/M2 | HEIGHT: 65 IN

## 2021-09-17 LAB
ANION GAP SERPL CALCULATED.3IONS-SCNC: 8.8 MMOL/L (ref 5–15)
BASOPHILS # BLD AUTO: 0.02 10*3/MM3 (ref 0–0.2)
BASOPHILS NFR BLD AUTO: 0.2 % (ref 0–1.5)
BUN SERPL-MCNC: 14 MG/DL (ref 6–20)
BUN/CREAT SERPL: 14.1 (ref 7–25)
CALCIUM SPEC-SCNC: 8.3 MG/DL (ref 8.6–10.5)
CHLORIDE SERPL-SCNC: 108 MMOL/L (ref 98–107)
CO2 SERPL-SCNC: 23.2 MMOL/L (ref 22–29)
CREAT SERPL-MCNC: 0.99 MG/DL (ref 0.76–1.27)
CYTO UR: NORMAL
DEPRECATED RDW RBC AUTO: 42.5 FL (ref 37–54)
EOSINOPHIL # BLD AUTO: 0.02 10*3/MM3 (ref 0–0.4)
EOSINOPHIL NFR BLD AUTO: 0.2 % (ref 0.3–6.2)
ERYTHROCYTE [DISTWIDTH] IN BLOOD BY AUTOMATED COUNT: 12.9 % (ref 12.3–15.4)
GFR SERPL CREATININE-BSD FRML MDRD: 80 ML/MIN/1.73
GLUCOSE SERPL-MCNC: 103 MG/DL (ref 65–99)
HCT VFR BLD AUTO: 33.9 % (ref 37.5–51)
HGB BLD-MCNC: 10.9 G/DL (ref 13–17.7)
IMM GRANULOCYTES # BLD AUTO: 0.07 10*3/MM3 (ref 0–0.05)
IMM GRANULOCYTES NFR BLD AUTO: 0.6 % (ref 0–0.5)
LAB AP CASE REPORT: NORMAL
LAB AP CLINICAL INFORMATION: NORMAL
LYMPHOCYTES # BLD AUTO: 1.99 10*3/MM3 (ref 0.7–3.1)
LYMPHOCYTES NFR BLD AUTO: 16 % (ref 19.6–45.3)
MCH RBC QN AUTO: 29.4 PG (ref 26.6–33)
MCHC RBC AUTO-ENTMCNC: 32.2 G/DL (ref 31.5–35.7)
MCV RBC AUTO: 91.4 FL (ref 79–97)
MONOCYTES # BLD AUTO: 0.91 10*3/MM3 (ref 0.1–0.9)
MONOCYTES NFR BLD AUTO: 7.3 % (ref 5–12)
NEUTROPHILS NFR BLD AUTO: 75.7 % (ref 42.7–76)
NEUTROPHILS NFR BLD AUTO: 9.44 10*3/MM3 (ref 1.7–7)
NRBC BLD AUTO-RTO: 0 /100 WBC (ref 0–0.2)
PATH REPORT.FINAL DX SPEC: NORMAL
PATH REPORT.GROSS SPEC: NORMAL
PLATELET # BLD AUTO: 190 10*3/MM3 (ref 140–450)
PMV BLD AUTO: 10.1 FL (ref 6–12)
POTASSIUM SERPL-SCNC: 3.8 MMOL/L (ref 3.5–5.2)
RBC # BLD AUTO: 3.71 10*6/MM3 (ref 4.14–5.8)
SODIUM SERPL-SCNC: 140 MMOL/L (ref 136–145)
WBC # BLD AUTO: 12.45 10*3/MM3 (ref 3.4–10.8)

## 2021-09-17 PROCEDURE — 25010000002 HEPARIN (PORCINE) PER 1000 UNITS: Performed by: SURGERY

## 2021-09-17 PROCEDURE — 85025 COMPLETE CBC W/AUTO DIFF WBC: CPT | Performed by: NURSE PRACTITIONER

## 2021-09-17 PROCEDURE — 80048 BASIC METABOLIC PNL TOTAL CA: CPT | Performed by: NURSE PRACTITIONER

## 2021-09-17 PROCEDURE — G0378 HOSPITAL OBSERVATION PER HR: HCPCS

## 2021-09-17 PROCEDURE — 25010000002 PIPERACILLIN SOD-TAZOBACTAM PER 1 G: Performed by: SURGERY

## 2021-09-17 RX ORDER — AMOXICILLIN AND CLAVULANATE POTASSIUM 875; 125 MG/1; MG/1
1 TABLET, FILM COATED ORAL EVERY 12 HOURS
Qty: 14 TABLET | Refills: 0 | Status: SHIPPED | OUTPATIENT
Start: 2021-09-17 | End: 2021-09-24

## 2021-09-17 RX ORDER — HYDROCODONE BITARTRATE AND ACETAMINOPHEN 5; 325 MG/1; MG/1
1 TABLET ORAL EVERY 4 HOURS PRN
Qty: 18 TABLET | Refills: 0 | Status: SHIPPED | OUTPATIENT
Start: 2021-09-17 | End: 2021-09-20

## 2021-09-17 RX ADMIN — HEPARIN SODIUM 5000 UNITS: 5000 INJECTION, SOLUTION INTRAVENOUS; SUBCUTANEOUS at 09:11

## 2021-09-17 RX ADMIN — TAZOBACTAM SODIUM AND PIPERACILLIN SODIUM 3.38 G: 375; 3 INJECTION, SOLUTION INTRAVENOUS at 05:48

## 2021-09-17 RX ADMIN — SODIUM CHLORIDE, PRESERVATIVE FREE 3 ML: 5 INJECTION INTRAVENOUS at 09:11

## 2021-09-17 NOTE — PLAN OF CARE
Goal Outcome Evaluation:  Plan of Care Reviewed With: patient        Progress: improving     Patient ambulated several laps in the scott throughout the shift. Received one pain pill during the night. No new issues or complaints. Anticipating discharge today.

## 2021-09-17 NOTE — DISCHARGE SUMMARY
Date of Discharge:  9/17/2021    Discharge Diagnosis:   Acute appendicitis with appendiceal abscess [K35.33]  Acute appendicitis, unspecified acute appendicitis type [K35.80]  Post-Op Diagnosis Codes:     * Acute appendicitis with appendiceal abscess [K35.33]     S/P laparoscopic appendectomy    Problem List:  Active Hospital Problems    Diagnosis  POA   • Acute appendicitis with appendiceal abscess [K35.33]  Unknown   • Acute appendicitis [K35.80]  Yes      Resolved Hospital Problems   No resolved problems to display.       Presenting Problem/History of Present Illness        Hospital Course  Patient is a 50 y.o. male presented to the ED at [facility] with a 36 hour history of right sided abdominal pain.  He had a CT scan that revealed he had and acute appendicitis with a periappendiceal abscess.  He was taken to surgery where he underwent a laparoscopic appendectomy.  He was admitted after the procedure for routine postoperative care.  Upon discharge to home he is tolerating a regular diet, his pain is controlled, and he is ambulating without difficulty.     Procedures Performed    Procedure(s):  APPENDECTOMY LAPAROSCOPIC  -------------------       Consults:   Consults     Date and Time Order Name Status Description    9/15/2021  8:34 PM Surgery (on-call MD unless specified) Completed               Vital Signs  Temp:  [97.7 °F (36.5 °C)-98.7 °F (37.1 °C)] 97.7 °F (36.5 °C)  Heart Rate:  [55-64] 58  Resp:  [16-18] 16  BP: (100-125)/(59-83) 123/83    Discharge Disposition  Home or Self Care     Condition:    Stable    Discharge Medications     Discharge Medications      New Medications      Instructions Start Date   amoxicillin-clavulanate 875-125 MG per tablet  Commonly known as: Augmentin   1 tablet, Oral, Every 12 Hours      HYDROcodone-acetaminophen 5-325 MG per tablet  Commonly known as: NORCO   1 tablet, Oral, Every 4 Hours PRN         Changes to Medications      Instructions Start Date   diclofenac 50 MG EC  tablet  Commonly known as: VOLTAREN  What changed: Another medication with the same name was removed. Continue taking this medication, and follow the directions you see here.   50 mg, Oral, 2 Times Daily PRN             Discharge Diet   Diet Instructions     Diet: Regular      Discharge Diet: Regular          Activity at Discharge  Activity Instructions     Activity as Tolerated      Bathing Restrictions      Type of Restriction: Bathing    Bathing Restrictions: Other    Explain Bathing Restrictions: may shower    Driving Restrictions      Type of Restriction: Driving    Driving Restrictions: No Driving While Taking Narcotics    Lifting Restrictions      Type of Restriction: Lifting    Lifting Restrictions: Lifting Restriction (Indicate Limit)    Weight Limit (Pounds): 10    Length of Lifting Restriction: 3 weeks          Follow-up Appointments  Future Appointments   Date Time Provider Department Center   9/27/2021  1:00 PM Jasmin Chadwick APRN [facility]     Additional Instructions for the Follow-ups that You Need to Schedule     Discharge Follow-up with Specialty: Dr Méndez on Monday   As directed      Specialty: Dr Méndez on Monday               Test Results Pending at Discharge  Pending Labs     Order Current Status    Tissue Pathology Exam In process

## 2021-09-17 NOTE — PROGRESS NOTES
[facility]     Progress Note    Patient Name: Noel Mueller  : 1970  MRN: [id number]    Date of admission: 9/15/2021    Subjective   Subjective     Patient feels okay today.  WBC 12 today.  Pain controlled. Tolerating regular diet.       Objective   Objective     Review of Systems:    A 10 point review of systems was performed and all are negative except for what is documented in the HPI.     Vitals:   Temp:  [97.7 °F (36.5 °C)-98.7 °F (37.1 °C)] 97.7 °F (36.5 °C)  Heart Rate:  [55-64] 58  Resp:  [16-18] 16  BP: (100-125)/(59-83) 123/83  Physical Exam    Constitutional: Awake, alert   Eyes: PERRLA    Neck: Supple, trachea midline   Respiratory: respirations even and unlabored   Cardiovascular: RRR   Gastrointestinal: Soft, approp TTP, ANIYAH with serosang  output   Psychiatric: Appropriate affect, cooperative   Neurologic: Oriented x 3, speech clear   Skin: No rashes     Result Review    Result Review:  I have personally reviewed the results from the time of this admission to 2021 10:34 EDT and agree with these findings:  [x]  Laboratory  []  Microbiology  []  Radiology  []  EKG/Telemetry   []  Cardiology/Vascular   []  Pathology  []  Old records  []  Other:      Assessment/Plan   Assessment / Plan     Diagnosis     S/p lap appy    Active Hospital Problems:  Active Hospital Problems    Diagnosis    • Acute appendicitis with appendiceal abscess    • Acute appendicitis        Plan:      DC home with ANIYAH drain  Follow up with Dr. Méndez Monday  Oral antibiotics       DVT prophylaxis:  Medical and mechanical DVT prophylaxis orders are present.          This document has been electronically signed by ANNETTA Hollins  2021 10:34 EDT

## 2021-09-17 NOTE — OUTREACH NOTE
Prep Survey      Responses   [facility] facility patient discharged from?  [facility]   Is LACE score < 7 ?  Yes   Emergency Room discharge w/ pulse ox?  No   Eligibility  TCM   Hospital  [facility]   Date of Admission  09/15/21   Date of Discharge  09/17/21   Discharge Disposition  Home or Self Care   Discharge diagnosis  Lap appe this visit   Does the patient have one of the following disease processes/diagnoses(primary or secondary)?  General Surgery   Does the patient have Home health ordered?  No   Is there a DME ordered?  No   Prep survey completed?  Yes          Abby Casarez RN

## 2021-09-20 ENCOUNTER — TRANSITIONAL CARE MANAGEMENT TELEPHONE ENCOUNTER (OUTPATIENT)
Dept: CALL CENTER | Facility: HOSPITAL | Age: 51
End: 2021-09-20

## 2021-09-20 ENCOUNTER — OFFICE VISIT (OUTPATIENT)
Dept: SURGERY | Facility: CLINIC | Age: 51
End: 2021-09-20

## 2021-09-20 VITALS — WEIGHT: 172.4 LBS | HEIGHT: 65 IN | BODY MASS INDEX: 28.72 KG/M2 | RESPIRATION RATE: 14 BRPM

## 2021-09-20 DIAGNOSIS — K35.33 ACUTE APPENDICITIS WITH APPENDICEAL ABSCESS: Primary | ICD-10-CM

## 2021-09-20 PROCEDURE — 99024 POSTOP FOLLOW-UP VISIT: CPT | Performed by: SURGERY

## 2021-09-20 NOTE — OUTREACH NOTE
Call Center TCM Note      Responses   [facility] patient discharged from?  [facility]   Does the patient have one of the following disease processes/diagnoses(primary or secondary)?  General Surgery   TCM attempt successful?  No   Unsuccessful attempts  Attempt 1          Jaquelin Ashford RN    9/20/2021, 16:03 EDT

## 2021-09-20 NOTE — PROGRESS NOTES
"Chief Complaint  Post-op Follow-up (appy)    Subjective          Noel Mueller presents to [facility] GENERAL SURGERY  History of Present Illness    Noel Mueller is a 50 y.o. male  who presents today for a postoperative visit.     Patient is here for a follow-up after a recent laparoscopic appendectomy for perforated appendicitis.  He is feeling better and is currently on some oral antibiotics.  He has a ANIYAH drain in place that is now not putting out much.    Past History:  Medical History: has no past medical history on file.   Surgical History: has a past surgical history that includes Shoulder Acromioplasty (Bilateral); Back surgery; Kidney surgery; and Appendectomy (N/A, 9/15/2021).   Family History: family history includes Leukemia in his mother.   Social History: reports that he has never smoked. He has never used smokeless tobacco. He reports previous alcohol use. He reports previous drug use.  Allergies: Patient has no known allergies.       Current Outpatient Medications:   •  amoxicillin-clavulanate (Augmentin) 875-125 MG per tablet, Take 1 tablet by mouth Every 12 (Twelve) Hours for 7 days., Disp: 14 tablet, Rfl: 0  •  diclofenac (VOLTAREN) 50 MG EC tablet, Take 50 mg by mouth 2 (Two) Times a Day As Needed., Disp: , Rfl:   •  HYDROcodone-acetaminophen (NORCO) 5-325 MG per tablet, Take 1 tablet by mouth Every 4 (Four) Hours As Needed for Moderate Pain  for up to 3 days., Disp: 18 tablet, Rfl: 0       Physical Exam  He looks a little sore but otherwise looks good.  We went ahead and removed his ANIYAH drain today.  Objective     Vital Signs:   Resp 14   Ht 165.1 cm (65\")   Wt 78.2 kg (172 lb 6.4 oz)   BMI 28.69 kg/m²              Assessment and Plan    Diagnoses and all orders for this visit:    1. Acute appendicitis with appendiceal abscess (Primary)    Will have him finish his oral antibiotics this week and then I will see him back in 1 week's time I have asked him to call me if  he " should begin to feel worse.

## 2021-09-20 NOTE — OUTREACH NOTE
Call Center TCM Note      Responses   [facility] patient discharged from?  [facility]   Does the patient have one of the following disease processes/diagnoses(primary or secondary)?  General Surgery   TCM attempt successful?  Yes   Call start time  1700   Call end time  1707   Discharge diagnosis  Lap appe this visit   Meds reviewed with patient/caregiver?  Yes   Is the patient having any side effects they believe may be caused by any medication additions or changes?  No   Does the patient have all medications related to this admission filled (includes all antibiotics, pain medications, etc.)  Yes   Is the patient taking all medications as directed (includes completed medication regime)?  Yes   Does the patient have a follow up appointment scheduled with their surgeon?  Yes [F/U completed today 9/20/21, another f/u on 9/28/21]   Has the patient kept scheduled appointments due by today?  Yes   Comments  Patient has new patient appt/hospital f/u on 9/27/21   Has home health visited the patient within 72 hours of discharge?  N/A   Psychosocial issues?  No   Did the patient receive a copy of their discharge instructions?  Yes   Nursing interventions  Reviewed instructions with patient   What is the patient's perception of their health status since discharge?  Improving [Pt completed his surgical f/u today and had drain removed--no issues reported at f/u.  Incision w/o issue, patient with regular BMs.  ]   Nursing interventions  Nurse provided patient education   Is the patient /caregiver able to teach back basic post-op care?  Lifting as instructed by MD in discharge instructions   Is the patient/caregiver able to teach back signs and symptoms of incisional infection?  Increased redness, swelling or pain at the incisonal site, Increased drainage or bleeding, Incisional warmth, Pus or odor from incision, Fever   Is the patient/caregiver able to teach back the hierarchy of who to call/visit for symptoms/problems? PCP,  Specialist, Home health nurse, Urgent Care, ED, 911  Yes   TCM call completed?  Yes          Jaquelin Ashford RN    9/20/2021, 17:08 EDT

## 2021-09-27 ENCOUNTER — OFFICE VISIT (OUTPATIENT)
Dept: FAMILY MEDICINE CLINIC | Facility: CLINIC | Age: 51
End: 2021-09-27

## 2021-09-27 VITALS
BODY MASS INDEX: 28.06 KG/M2 | SYSTOLIC BLOOD PRESSURE: 113 MMHG | OXYGEN SATURATION: 99 % | DIASTOLIC BLOOD PRESSURE: 74 MMHG | WEIGHT: 168.4 LBS | HEIGHT: 65 IN | HEART RATE: 54 BPM

## 2021-09-27 DIAGNOSIS — G89.29 NECK PAIN, CHRONIC: ICD-10-CM

## 2021-09-27 DIAGNOSIS — G89.29 CHRONIC THORACIC BACK PAIN, UNSPECIFIED BACK PAIN LATERALITY: ICD-10-CM

## 2021-09-27 DIAGNOSIS — G89.29 CHRONIC LOW BACK PAIN, UNSPECIFIED BACK PAIN LATERALITY, UNSPECIFIED WHETHER SCIATICA PRESENT: Primary | ICD-10-CM

## 2021-09-27 DIAGNOSIS — Z90.49 STATUS POST APPENDECTOMY: ICD-10-CM

## 2021-09-27 DIAGNOSIS — M54.6 CHRONIC THORACIC BACK PAIN, UNSPECIFIED BACK PAIN LATERALITY: ICD-10-CM

## 2021-09-27 DIAGNOSIS — M54.2 NECK PAIN, CHRONIC: ICD-10-CM

## 2021-09-27 DIAGNOSIS — M54.50 CHRONIC LOW BACK PAIN, UNSPECIFIED BACK PAIN LATERALITY, UNSPECIFIED WHETHER SCIATICA PRESENT: Primary | ICD-10-CM

## 2021-09-27 PROCEDURE — 99203 OFFICE O/P NEW LOW 30 MIN: CPT | Performed by: NURSE PRACTITIONER

## 2021-09-27 NOTE — PROGRESS NOTES
Chief Complaint  Establish Care and Post-op (appendectomy )    Subjective          Noel Mueller presents to [facility] for   History of Present Illness    Patient is here to establish care, patient's previous PCP was someone in [place].  Patient states he has no chronic medical problems other than back and neck pain.    Patient has been in pain management in another state, requesting referral to get back into pain management here. Chronic neck, middle, and low back pain. Pt states has digital copy of medical records he can give pain management. Pt states he takes diclofenac daily. Also tylenol as needed. Pt was getting spinal injections, states he did not take any controlled substances and does not desire to do so.    Patient is 2 weeks post-op from an appendectomy. Patient denies any pain or complications. Has had f/u with surgery.  states he is feeling well  Medical History  Past Medical History:   Diagnosis Date   • Hyperlipidemia      Surgical History  Past Surgical History:   Procedure Laterality Date   • APPENDECTOMY N/A 9/15/2021    Procedure: APPENDECTOMY LAPAROSCOPIC;  Surgeon: Steven Méndez MD;  Location: [facility] OR;  Service: General;  Laterality: N/A;   • BACK SURGERY     • KIDNEY SURGERY     • SHOULDER ACROMIOPLASTY WITH ROTATOR CUFF REPAIR Bilateral      Social History  Social History     Socioeconomic History   • Marital status:      Spouse name: Not on file   • Number of children: Not on file   • Years of education: Not on file   • Highest education level: Not on file   Tobacco Use   • Smoking status: Never Smoker   • Smokeless tobacco: Never Used   Vaping Use   • Vaping Use: Never used   Substance and Sexual Activity   • Alcohol use: Not Currently   • Drug use: Never   • Sexual activity: Defer     No current outpatient medications on file.    Review of Systems     Objective     /74 (BP Location: Left arm, Patient Position: Sitting, Cuff Size:  "Adult)   Pulse 54   Ht 165.1 cm (65\")   Wt 76.4 kg (168 lb 6.4 oz)   SpO2 99%   BMI 28.02 kg/m²     Body mass index is 28.02 kg/m².    Physical Exam  Vitals reviewed.   Constitutional:       Appearance: Normal appearance. He is well-developed.   HENT:      Head: Normocephalic and atraumatic.      Right Ear: External ear normal.      Left Ear: External ear normal.      Mouth/Throat:      Pharynx: No oropharyngeal exudate.   Eyes:      Conjunctiva/sclera: Conjunctivae normal.      Pupils: Pupils are equal, round, and reactive to light.   Cardiovascular:      Rate and Rhythm: Normal rate and regular rhythm.      Heart sounds: No murmur heard.   No friction rub. No gallop.    Pulmonary:      Effort: Pulmonary effort is normal.      Breath sounds: Normal breath sounds. No wheezing or rhonchi.   Abdominal:      General: There is no distension.      Comments: Surgical incisions on abdomen appear to be healing well, no erythema or drainage noted   Skin:     General: Skin is warm and dry.   Neurological:      Mental Status: He is alert and oriented to person, place, and time.      Cranial Nerves: No cranial nerve deficit.   Psychiatric:         Mood and Affect: Mood and affect normal.         Behavior: Behavior normal.         Thought Content: Thought content normal.         Judgment: Judgment normal.         Result Review :     The following data was reviewed by: ANNETTA Poole on 09/27/2021:                         Assessment:  Diagnoses and all orders for this visit:    1. Chronic low back pain, unspecified back pain laterality, unspecified whether sciatica present (Primary)  -     Ambulatory Referral to Pain Management    2. Status post appendectomy  Comments:  Appears to be healing well, follow-up with surgery as directed    3. Neck pain, chronic  -     Ambulatory Referral to Pain Management    4. Chronic thoracic back pain, unspecified back pain laterality  -     Ambulatory Referral to Pain " Management              Follow Up     No follow-ups on file.    Patient was given instructions and counseling regarding his condition or for health maintenance advice. Please see specific information pulled into the AVS if appropriate.     Jasmin Chadwick, APRN  09/27/2021

## 2021-09-28 ENCOUNTER — OFFICE VISIT (OUTPATIENT)
Dept: SURGERY | Facility: CLINIC | Age: 51
End: 2021-09-28

## 2021-09-28 DIAGNOSIS — K35.33 ACUTE APPENDICITIS WITH APPENDICEAL ABSCESS: Primary | ICD-10-CM

## 2021-09-28 PROCEDURE — 99024 POSTOP FOLLOW-UP VISIT: CPT | Performed by: SURGERY

## 2021-09-28 NOTE — PROGRESS NOTES
Chief Complaint  Post-op    Subjective          Noel Mueller presents to [facility]  History of Present Illness    Noel Mueller is a 50 y.o. male  who presents today for a postoperative visit.     Patient is here for a follow-up after a recent laparoscopic appendectomy for perforated appendicitis.  He is continuing to improve.  He is not really having any abdominal pain and is eating fine and going to the bathroom normally.    Past History:  Medical History: has a past medical history of Hyperlipidemia.   Surgical History: has a past surgical history that includes Shoulder Acromioplasty (Bilateral); Back surgery; Kidney surgery; and Appendectomy (N/A, 9/15/2021).   Family History: family history includes Leukemia in his mother.   Social History: reports that he has never smoked. He has never used smokeless tobacco. He reports previous alcohol use. He reports that he does not use drugs.  Allergies: Patient has no known allergies.     No current outpatient medications on file.       Physical Exam  On physical exam he appears well today.  His incisions look good with no evidence of infection  Objective     Vital Signs:   There were no vitals taken for this visit.             Assessment and Plan    Diagnoses and all orders for this visit:    1. Acute appendicitis with appendiceal abscess (Primary)    We will see him back on an as-needed basis.  I have asked him to call me should he have any further problems.

## 2022-02-08 ENCOUNTER — OFFICE VISIT (OUTPATIENT)
Dept: FAMILY MEDICINE CLINIC | Facility: CLINIC | Age: 52
End: 2022-02-08

## 2022-02-08 VITALS
BODY MASS INDEX: 30.16 KG/M2 | OXYGEN SATURATION: 100 % | SYSTOLIC BLOOD PRESSURE: 123 MMHG | DIASTOLIC BLOOD PRESSURE: 75 MMHG | HEIGHT: 65 IN | HEART RATE: 84 BPM | WEIGHT: 181 LBS

## 2022-02-08 DIAGNOSIS — Z13.220 SCREENING FOR CHOLESTEROL LEVEL: ICD-10-CM

## 2022-02-08 DIAGNOSIS — Z12.5 SCREENING FOR PROSTATE CANCER: ICD-10-CM

## 2022-02-08 DIAGNOSIS — Z13.29 SCREENING FOR THYROID DISORDER: ICD-10-CM

## 2022-02-08 DIAGNOSIS — Z23 NEED FOR INFLUENZA VACCINATION: ICD-10-CM

## 2022-02-08 DIAGNOSIS — R35.0 URINE FREQUENCY: ICD-10-CM

## 2022-02-08 DIAGNOSIS — Z00.00 ANNUAL PHYSICAL EXAM: Primary | ICD-10-CM

## 2022-02-08 DIAGNOSIS — N52.9 ERECTILE DYSFUNCTION, UNSPECIFIED ERECTILE DYSFUNCTION TYPE: ICD-10-CM

## 2022-02-08 PROCEDURE — 99396 PREV VISIT EST AGE 40-64: CPT | Performed by: NURSE PRACTITIONER

## 2022-02-08 PROCEDURE — 90686 IIV4 VACC NO PRSV 0.5 ML IM: CPT | Performed by: NURSE PRACTITIONER

## 2022-02-08 PROCEDURE — 90471 IMMUNIZATION ADMIN: CPT | Performed by: NURSE PRACTITIONER

## 2022-02-08 NOTE — PROGRESS NOTES
Chief Complaint  Urinary Frequency and Erectile Dysfunction    Subjective            Noel Mueller presents to [facility]  Pt here today for annual cpe:  He would like  to discuss a referral to urology for erectile dysfunction and urinary frequency. Pt states has had problem for months and stopped taking all prescribed medications in hopes to relieve the issue.     Pt was taking the following meds but stopped to see if it helped with urinary issues but he got no relief. He not longer is taking anything.   - diclofenac (daily)    - tylenol 3 (as needed)   - metaxalone (as needed)   - trazadone (as needed for sleep)   - fiorecet (as needed for headaches)    Pt would like a flu vaccine today and discuss the covid booster and shingles vaccine.    Pt due labs.    Pt states had a normal colonoscopy in Dec 2019 while stationed in [place].                   [facility]  Past Medical History:   Diagnosis Date   • Hyperlipidemia    • Low back pain 2008       ALLERGY  No Known Allergies     SURGICALHX  Past Surgical History:   Procedure Laterality Date   • APPENDECTOMY N/A 9/15/2021    Procedure: APPENDECTOMY LAPAROSCOPIC;  Surgeon: Steven Méndez MD;  Location: [facility];  Service: General;  Laterality: N/A;   • BACK SURGERY     • COLONOSCOPY  12/2019   • KIDNEY SURGERY     • SHOULDER ACROMIOPLASTY WITH ROTATOR CUFF REPAIR Bilateral    • VASECTOMY  2015        SOCX  Social History     Tobacco Use   • Smoking status: Never Smoker   • Smokeless tobacco: Never Used   Substance Use Topics   • Alcohol use: Yes     Alcohol/week: 6.0 standard drinks     Types: 3 Glasses of wine, 3 Cans of beer per week     Comment: Usually have a glass of wine or a beer with dinner       FAMHX  Family History   Problem Relation Age of Onset   • Leukemia Mother    • Cancer Mother         Leukemia        MEDSIGONLY  No current outpatient medications on file prior to visit.     No current facility-administered  "medications on file prior to visit.       Health Maintenance Due   Topic Date Due   • COLORECTAL CANCER SCREENING  Never done   • ANNUAL PHYSICAL  Never done   • TDAP/TD VACCINES (1 - Tdap) Never done   • ZOSTER VACCINE (1 of 2) Never done   • COVID-19 Vaccine (2 - Booster for Quinton series) 05/07/2021   • INFLUENZA VACCINE  Never done   • HEPATITIS C SCREENING  Never done   • LIPID PANEL  Never done       Objective     /75   Pulse 84   Ht 165.1 cm (65\")   Wt 82.1 kg (181 lb)   SpO2 100%   BMI 30.12 kg/m²       Physical Exam  Constitutional:       General: He is not in acute distress.     Appearance: Normal appearance. He is not ill-appearing.   HENT:      Head: Normocephalic and atraumatic.      Right Ear: Tympanic membrane, ear canal and external ear normal.      Left Ear: Tympanic membrane, ear canal and external ear normal.      Mouth/Throat:      Pharynx: No oropharyngeal exudate or posterior oropharyngeal erythema.   Cardiovascular:      Rate and Rhythm: Normal rate and regular rhythm.      Heart sounds: Normal heart sounds. No murmur heard.      Pulmonary:      Effort: Pulmonary effort is normal. No respiratory distress.      Breath sounds: Normal breath sounds.   Chest:      Chest wall: No tenderness.   Abdominal:      General: Abdomen is flat. Bowel sounds are normal. There is no distension.      Palpations: Abdomen is soft. There is no mass.      Tenderness: There is no abdominal tenderness. There is no guarding.   Musculoskeletal:         General: No swelling or tenderness. Normal range of motion.      Cervical back: Normal range of motion and neck supple.   Skin:     General: Skin is warm and dry.      Findings: No rash.   Neurological:      General: No focal deficit present.      Mental Status: He is alert and oriented to person, place, and time. Mental status is at baseline.      Gait: Gait normal.   Psychiatric:         Mood and Affect: Mood normal.         Behavior: Behavior normal.       "   Thought Content: Thought content normal.         Judgment: Judgment normal.           Result Review :                           Assessment and Plan        Diagnoses and all orders for this visit:    1. Annual physical exam (Primary)    2. Need for influenza vaccination  -     FluLaval/Fluarix/Fluzone >6 Months ([id number])    3. Screening for cholesterol level  -     Comprehensive metabolic panel; Future  -     Lipid panel; Future    4. Screening for thyroid disorder  -     CBC w AUTO Differential; Future  -     TSH; Future  -     T4, free; Future    5. Urine frequency  -     Ambulatory Referral to Urology    6. Erectile dysfunction, unspecified erectile dysfunction type  -     Ambulatory Referral to Urology    7. Screening for prostate cancer  -     PSA SCREENING; Future    Preventative Counseling:  Avoid alcohol and illegal drugs  Get adequate sleep  Healthy diet and daily exercise          Follow Up     Return in about 1 year (around 2/8/2023).    Patient was given instructions and counseling regarding his condition or for health maintenance advice. Please see specific information pulled into the AVS if appropriate.     Noel Mueller  reports that he has never smoked. He has never used smokeless tobacco..

## 2022-02-09 ENCOUNTER — TELEPHONE (OUTPATIENT)
Dept: FAMILY MEDICINE CLINIC | Facility: CLINIC | Age: 52
End: 2022-02-09

## 2022-02-09 NOTE — TELEPHONE ENCOUNTER
Caller: Noel Mueller    Relationship to patient: Self    Best call back number: [phone]    Chief complaint: RIGHT KNEE PAIN    Type of visit: OFFICE    Requested date: 02/09/22 OR ASAP       Additional notes:THE PATIENT WOULD LIKE A CALL BACK TO SCHEDULE AN APPOINTMENT ASAP. PCP IS OUT OF OFFICE. HE STATED HE IS HAVING RIGHT KNEE PAIN

## 2022-02-11 ENCOUNTER — LAB (OUTPATIENT)
Dept: LAB | Facility: HOSPITAL | Age: 52
End: 2022-02-11

## 2022-02-11 DIAGNOSIS — Z13.220 SCREENING FOR CHOLESTEROL LEVEL: ICD-10-CM

## 2022-02-11 DIAGNOSIS — Z12.5 SCREENING FOR PROSTATE CANCER: ICD-10-CM

## 2022-02-11 DIAGNOSIS — Z13.29 SCREENING FOR THYROID DISORDER: ICD-10-CM

## 2022-02-11 LAB
ALBUMIN SERPL-MCNC: 4.3 G/DL (ref 3.5–5.2)
ALBUMIN/GLOB SERPL: 1.3 G/DL
ALP SERPL-CCNC: 64 U/L (ref 39–117)
ALT SERPL W P-5'-P-CCNC: 27 U/L (ref 1–41)
ANION GAP SERPL CALCULATED.3IONS-SCNC: 8.8 MMOL/L (ref 5–15)
AST SERPL-CCNC: 26 U/L (ref 1–40)
BASOPHILS # BLD AUTO: 0.04 10*3/MM3 (ref 0–0.2)
BASOPHILS NFR BLD AUTO: 0.6 % (ref 0–1.5)
BILIRUB SERPL-MCNC: 0.4 MG/DL (ref 0–1.2)
BUN SERPL-MCNC: 21 MG/DL (ref 6–20)
BUN/CREAT SERPL: 20.4 (ref 7–25)
CALCIUM SPEC-SCNC: 9 MG/DL (ref 8.6–10.5)
CHLORIDE SERPL-SCNC: 102 MMOL/L (ref 98–107)
CHOLEST SERPL-MCNC: 240 MG/DL (ref 0–200)
CO2 SERPL-SCNC: 25.2 MMOL/L (ref 22–29)
CREAT SERPL-MCNC: 1.03 MG/DL (ref 0.76–1.27)
DEPRECATED RDW RBC AUTO: 38.5 FL (ref 37–54)
EOSINOPHIL # BLD AUTO: 0.11 10*3/MM3 (ref 0–0.4)
EOSINOPHIL NFR BLD AUTO: 1.7 % (ref 0.3–6.2)
ERYTHROCYTE [DISTWIDTH] IN BLOOD BY AUTOMATED COUNT: 12.7 % (ref 12.3–15.4)
GFR SERPL CREATININE-BSD FRML MDRD: 92 ML/MIN/1.73
GLOBULIN UR ELPH-MCNC: 3.3 GM/DL
GLUCOSE SERPL-MCNC: 77 MG/DL (ref 65–99)
HCT VFR BLD AUTO: 42 % (ref 37.5–51)
HDLC SERPL-MCNC: 55 MG/DL (ref 40–60)
HGB BLD-MCNC: 13.9 G/DL (ref 13–17.7)
IMM GRANULOCYTES # BLD AUTO: 0.01 10*3/MM3 (ref 0–0.05)
IMM GRANULOCYTES NFR BLD AUTO: 0.2 % (ref 0–0.5)
LDLC SERPL CALC-MCNC: 170 MG/DL (ref 0–100)
LDLC/HDLC SERPL: 3.04 {RATIO}
LYMPHOCYTES # BLD AUTO: 1.75 10*3/MM3 (ref 0.7–3.1)
LYMPHOCYTES NFR BLD AUTO: 27.2 % (ref 19.6–45.3)
MCH RBC QN AUTO: 28.1 PG (ref 26.6–33)
MCHC RBC AUTO-ENTMCNC: 33.1 G/DL (ref 31.5–35.7)
MCV RBC AUTO: 85 FL (ref 79–97)
MONOCYTES # BLD AUTO: 0.67 10*3/MM3 (ref 0.1–0.9)
MONOCYTES NFR BLD AUTO: 10.4 % (ref 5–12)
NEUTROPHILS NFR BLD AUTO: 3.86 10*3/MM3 (ref 1.7–7)
NEUTROPHILS NFR BLD AUTO: 59.9 % (ref 42.7–76)
NRBC BLD AUTO-RTO: 0 /100 WBC (ref 0–0.2)
PLATELET # BLD AUTO: 265 10*3/MM3 (ref 140–450)
PMV BLD AUTO: 10 FL (ref 6–12)
POTASSIUM SERPL-SCNC: 4.2 MMOL/L (ref 3.5–5.2)
PROT SERPL-MCNC: 7.6 G/DL (ref 6–8.5)
PSA SERPL-MCNC: 2.78 NG/ML (ref 0–4)
RBC # BLD AUTO: 4.94 10*6/MM3 (ref 4.14–5.8)
SODIUM SERPL-SCNC: 136 MMOL/L (ref 136–145)
T4 FREE SERPL-MCNC: 1.04 NG/DL (ref 0.93–1.7)
TRIGL SERPL-MCNC: 88 MG/DL (ref 0–150)
TSH SERPL DL<=0.05 MIU/L-ACNC: 0.61 UIU/ML (ref 0.27–4.2)
VLDLC SERPL-MCNC: 15 MG/DL (ref 5–40)
WBC NRBC COR # BLD: 6.44 10*3/MM3 (ref 3.4–10.8)

## 2022-02-11 PROCEDURE — 84439 ASSAY OF FREE THYROXINE: CPT

## 2022-02-11 PROCEDURE — 36415 COLL VENOUS BLD VENIPUNCTURE: CPT

## 2022-02-11 PROCEDURE — 80061 LIPID PANEL: CPT

## 2022-02-11 PROCEDURE — 85025 COMPLETE CBC W/AUTO DIFF WBC: CPT

## 2022-02-11 PROCEDURE — G0103 PSA SCREENING: HCPCS

## 2022-02-11 PROCEDURE — 84443 ASSAY THYROID STIM HORMONE: CPT

## 2022-02-11 PROCEDURE — 80053 COMPREHEN METABOLIC PANEL: CPT

## 2022-02-14 ENCOUNTER — TELEPHONE (OUTPATIENT)
Dept: FAMILY MEDICINE CLINIC | Facility: CLINIC | Age: 52
End: 2022-02-14

## 2022-02-16 ENCOUNTER — OFFICE VISIT (OUTPATIENT)
Dept: FAMILY MEDICINE CLINIC | Facility: CLINIC | Age: 52
End: 2022-02-16

## 2022-02-16 VITALS
HEIGHT: 65 IN | DIASTOLIC BLOOD PRESSURE: 57 MMHG | WEIGHT: 177 LBS | OXYGEN SATURATION: 98 % | SYSTOLIC BLOOD PRESSURE: 108 MMHG | HEART RATE: 73 BPM | BODY MASS INDEX: 29.49 KG/M2

## 2022-02-16 DIAGNOSIS — M25.561 ACUTE PAIN OF RIGHT KNEE: Primary | ICD-10-CM

## 2022-02-16 PROCEDURE — 99213 OFFICE O/P EST LOW 20 MIN: CPT | Performed by: NURSE PRACTITIONER

## 2022-02-16 RX ORDER — DICLOFENAC SODIUM 75 MG/1
75 TABLET, DELAYED RELEASE ORAL DAILY PRN
COMMUNITY

## 2022-02-16 NOTE — PROGRESS NOTES
Chief Complaint  Knee Pain (Right)    Subjective            Noel Mueller presents to [facility]  Pt has (R) knee pain. Pt was playing racket ball and heard a pop.  After that he has had extreme pain on the lateral side of his knee cap.  Pt went to   Had and xray and found to have sprain of ligament in (R) knee. Was told he most likely had a meniscus tear but needed an MRI to evaluate.        PMH  Past Medical History:   Diagnosis Date   • Hyperlipidemia    • Low back pain 2008       ALLERGY  No Known Allergies     SURGICALHX  Past Surgical History:   Procedure Laterality Date   • APPENDECTOMY N/A 9/15/2021    Procedure: APPENDECTOMY LAPAROSCOPIC;  Surgeon: Steven Méndez MD;  [facility];  Service: General;  Laterality: N/A;   • BACK SURGERY     • COLONOSCOPY  12/2019   • KIDNEY SURGERY     • SHOULDER ACROMIOPLASTY WITH ROTATOR CUFF REPAIR Bilateral    • VASECTOMY  2015        SOCX  Social History     Tobacco Use   • Smoking status: Never Smoker   • Smokeless tobacco: Never Used   Substance Use Topics   • Alcohol use: Yes     Alcohol/week: 6.0 standard drinks     Types: 3 Glasses of wine, 3 Cans of beer per week     Comment: Usually have a glass of wine or a beer with dinner       FAMHX  Family History   Problem Relation Age of Onset   • Leukemia Mother    • Cancer Mother         Leukemia        MEDSIGONLY  Current Outpatient Medications on File Prior to Visit   Medication Sig   • Acetaminophen-Codeine (TYLENOL WITH CODEINE #3 PO) Take  by mouth Daily As Needed.   • diclofenac (VOLTAREN) 75 MG EC tablet Take 75 mg by mouth Daily.     No current facility-administered medications on file prior to visit.       Health Maintenance Due   Topic Date Due   • COLORECTAL CANCER SCREENING  Never done   • TDAP/TD VACCINES (1 - Tdap) Never done   • ZOSTER VACCINE (1 of 2) Never done   • COVID-19 Vaccine (2 - Booster for Quinton series) 05/07/2021   • HEPATITIS C SCREENING   "Never done       Objective     /57   Pulse 73   Ht 165.1 cm (65\")   Wt 80.3 kg (177 lb)   SpO2 98%   BMI 29.45 kg/m²       Physical Exam  Constitutional:       General: He is not in acute distress.     Appearance: Normal appearance. He is not ill-appearing.   HENT:      Head: Normocephalic and atraumatic.      Mouth/Throat:      Pharynx: No oropharyngeal exudate or posterior oropharyngeal erythema.   Cardiovascular:      Rate and Rhythm: Normal rate and regular rhythm.      Heart sounds: Normal heart sounds. No murmur heard.      Pulmonary:      Effort: Pulmonary effort is normal. No respiratory distress.      Breath sounds: Normal breath sounds.   Chest:      Chest wall: No tenderness.   Abdominal:      General: There is no distension.      Palpations: There is no mass.      Tenderness: There is no abdominal tenderness. There is no guarding.   Musculoskeletal:         General: No swelling. Normal range of motion.      Cervical back: Normal range of motion and neck supple.      Right knee: No swelling or deformity. Tenderness present over the lateral joint line.      Comments: Tenderness upon palpation to lateral side of knee cap   Skin:     General: Skin is warm and dry.      Findings: No rash.   Neurological:      General: No focal deficit present.      Mental Status: He is alert and oriented to person, place, and time. Mental status is at baseline.      Gait: Gait normal.   Psychiatric:         Mood and Affect: Mood normal.         Behavior: Behavior normal.         Thought Content: Thought content normal.         Judgment: Judgment normal.           Result Review :                           Assessment and Plan        Diagnoses and all orders for this visit:    1. Acute pain of right knee (Primary)  Comments:  will consider ortho referral after MRI results come  back if needed  Orders:  -     MRI Knee Right Without Contrast; Future    Other orders  -     Cancel: Ambulatory Referral to Orthopedic " Surgery              Follow Up     Return if symptoms worsen or fail to improve.    Patient was given instructions and counseling regarding his condition or for health maintenance advice. Please see specific information pulled into the AVS if appropriate.     Noel Mueller  reports that he has never smoked. He has never used smokeless tobacco..

## 2022-03-08 ENCOUNTER — TELEPHONE (OUTPATIENT)
Dept: FAMILY MEDICINE CLINIC | Facility: CLINIC | Age: 52
End: 2022-03-08

## 2022-03-08 ENCOUNTER — HOSPITAL ENCOUNTER (OUTPATIENT)
Dept: MRI IMAGING | Facility: HOSPITAL | Age: 52
Discharge: HOME | End: 2022-03-08
Admitting: NURSE PRACTITIONER

## 2022-03-08 DIAGNOSIS — M25.561 ACUTE PAIN OF RIGHT KNEE: ICD-10-CM

## 2022-03-08 PROCEDURE — 73721 MRI JNT OF LWR EXTRE W/O DYE: CPT

## 2022-03-08 NOTE — TELEPHONE ENCOUNTER
----- Message from ANNETTA Nathan sent at 3/8/2022  9:21 AM EST -----  Lateral meniscus tear consult ortho

## 2022-03-11 ENCOUNTER — PREP FOR SURGERY (OUTPATIENT)
Dept: OTHER | Facility: HOSPITAL | Age: 52
End: 2022-03-11

## 2022-03-11 ENCOUNTER — OFFICE VISIT (OUTPATIENT)
Dept: ORTHOPEDIC SURGERY | Facility: CLINIC | Age: 52
End: 2022-03-11

## 2022-03-11 VITALS — BODY MASS INDEX: 28.32 KG/M2 | WEIGHT: 170 LBS | HEIGHT: 65 IN

## 2022-03-11 DIAGNOSIS — S83.281A TEAR OF LATERAL MENISCUS OF RIGHT KNEE, UNSPECIFIED TEAR TYPE, UNSPECIFIED WHETHER OLD OR CURRENT TEAR, INITIAL ENCOUNTER: Primary | ICD-10-CM

## 2022-03-11 PROCEDURE — 99204 OFFICE O/P NEW MOD 45 MIN: CPT | Performed by: ORTHOPAEDIC SURGERY

## 2022-03-11 RX ORDER — CEFAZOLIN SODIUM 2 G/100ML
2 INJECTION, SOLUTION INTRAVENOUS ONCE
Status: CANCELLED | OUTPATIENT
Start: 2022-03-11 | End: 2022-03-11

## 2022-03-11 NOTE — H&P (VIEW-ONLY)
"Chief Complaint  Pain of the Right Knee     Subjective      Noel Mueller presents to [facility] for evaluation of the right knee. The patient was playing racket ball and felt a pop about 1 month ago in his knee. He reports he had instability to the knee and could not bear weight. He locates the pain to the lateral and medial and anterior knee. He reports he still has popping with ROM. He can now bear weight. He has used heat and ice to the right knee. He reports discomfort while running. He has had x-rays and an MRI. He has no other complaints.     No Known Allergies     Social History     Socioeconomic History   • Marital status:    Tobacco Use   • Smoking status: Never Smoker   • Smokeless tobacco: Never Used   Vaping Use   • Vaping Use: Never used   Substance and Sexual Activity   • Alcohol use: Yes     Alcohol/week: 6.0 standard drinks     Types: 3 Glasses of wine, 3 Cans of beer per week     Comment: Usually have a glass of wine or a beer with dinner   • Drug use: Never   • Sexual activity: Yes     Partners: Female     Birth control/protection: None        Review of Systems     Objective   Vital Signs:   Ht 165.1 cm (65\")   Wt 77.1 kg (170 lb)   BMI 28.29 kg/m²       Physical Exam  Constitutional:       Appearance: Normal appearance. The patient is well-developed and normal weight.   HENT:      Head: Normocephalic.      Right Ear: Hearing and external ear normal.      Left Ear: Hearing and external ear normal.      Nose: Nose normal.   Eyes:      Conjunctiva/sclera: Conjunctivae normal.   Cardiovascular:      Rate and Rhythm: Normal rate.   Pulmonary:      Effort: Pulmonary effort is normal.      Breath sounds: No wheezing or rales.   Abdominal:      Palpations: Abdomen is soft.      Tenderness: There is no abdominal tenderness.   Musculoskeletal:      Cervical back: Normal range of motion.   Skin:     Findings: No rash.   Neurological:      Mental Status: The patient " "is alert and oriented to person, place, and time.   Psychiatric:         Mood and Affect: Mood and affect normal.         Judgment: Judgment normal.       Ortho Exam      Right knee- Stable to varus/valgus stress. Stable to anterior/posterior drawer. Negative Rayne's. Negative Lachman's. Positive EHL, FHL, GS and TA. Sensation intact to all 5 nerves of the foot. Positive pulses. Neurovascularly intact. Good strength to hamstrings, quadriceps, dorsiflexors, and plantar flexors.  ROM 0-135 degrees. No medial joint line tenderness. Tender to the lateral joint line.      Procedures      Imaging Results (Most Recent)     None           Result Review :       XR Knee 1 or 2 View Right    Result Date: 2/9/2022  Narrative: PROCEDURE: XR KNEE 1 OR 2 VW RIGHT  COMPARISON: None available.  INDICATIONS: Injury yesterday with \"pop\" while turning and walking, pain developed along inferior lateral knee and is worse with weightbearing.  No effusion on exam.  FINDINGS:  No evidence of acute fracture or dislocation.  The joint spaces are well maintained.  Moderate joint effusion.      Impression:  Moderate joint effusion, without radiographic evidence of acute fracture or dislocation.      GERARD BAZZI MD       Electronically Signed and Approved By: GERARD BAZZI MD on 2/09/2022 at 13:22             MRI Knee Right Without Contrast    Result Date: 3/8/2022  Narrative: PROCEDURE: MRI KNEE RIGHT  WO CONTRAST  COMPARISON: [facility], , XR KNEE 1 OR 2 VW RIGHT, 2/09/2022, 12:14.  INDICATIONS: RIGHT ANTERIOLATERAL KNEE PAIN. POST PLAYING RACQUETBALL ~ 3 WEEKS AGO.  TECHNIQUE: A complete multi-planar MRI was performed.   FINDINGS:  SOFT TISSUES:  Small joint effusion with mild diffuse synovial thickening.  Small 3 mm nodular area of synovial thickening in the posterior lateral knee joint space versus a small intra-articular body.  Mild nonspecific edema in the lateral infrapatellar fat pad.  No popliteal cyst.  Mild " nonspecific prepatellar soft tissue edema.  No solid soft tissue mass.  MENISCI: The medial meniscus is intact.  Partial-thickness curvilinear radial tear through the lateral meniscus anterior body.  No displaced meniscal fragment.  CRUCIATE LIGAMENTS: The ACL is intact. The PCL is intact.  COLLATERAL LIGAMENTS: The MCL is intact. The LCL complex is intact.  EXTENSOR MECHANISM: The quadriceps tendon is intact. The patellar tendon is intact.  ARTICULAR CARTILAGE:  The articular cartilage is well maintained in all 3 compartments.  BONES:  No fracture.  No concerning bone marrow lesion or marrow replacing process.      Impression:   1. Partial-thickness curvilinear radial tear through the lateral meniscus anterior body.  No displaced meniscal fragment. 2. Small joint effusion with nodular synovial thickening or small intra-articular body in the posterior lateral knee joint space. 3. Mild nonspecific edema in the lateral infrapatellar fat pad, which could be reactive or reflect fat pad impingement.     GERARD BAZZI MD       Electronically Signed and Approved By: GERARD BAZZI MD on 3/08/2022 at 9:12                      Assessment and Plan     DX: Right knee lateral meniscus tear.      Discussed the treatment options with the patient, operative vs non-operative. Discussed the risks and benefits of a right knee arthroscopic Partial lateral meniscectomy. The patient expressed understanding and wished to proceed.     Discussed surgery., Risks/benefits discussed with patient including, but not limited to: infection, bleeding, neurovascular damage, malunion, nonunion, aesthetic deformity, need for further surgery, and death., Discussed with patient the implant type being used during surgery and patient understands and desires to proceed., Surgery pamphlet given. and Call or return if worsening symptoms.    Follow Up     2 weeks postoperatively.        Patient was given instructions and counseling regarding his condition or  for health maintenance advice. Please see specific information pulled into the AVS if appropriate.     Scribed for Dung Petersen MD by Aliya Fagan.  03/11/22   08:04 EST    I have personally performed the services described in this document as scribed by the above individual and it is both accurate and complete. Dung Petersen MD 03/13/22

## 2022-03-11 NOTE — PROGRESS NOTES
"Chief Complaint  Pain of the Right Knee     Subjective      Noel Mueller presents to [facility] for evaluation of the right knee. The patient was playing racket ball and felt a pop about 1 month ago in his knee. He reports he had instability to the knee and could not bear weight. He locates the pain to the lateral and medial and anterior knee. He reports he still has popping with ROM. He can now bear weight. He has used heat and ice to the right knee. He reports discomfort while running. He has had x-rays and an MRI. He has no other complaints.     No Known Allergies     Social History     Socioeconomic History   • Marital status:    Tobacco Use   • Smoking status: Never Smoker   • Smokeless tobacco: Never Used   Vaping Use   • Vaping Use: Never used   Substance and Sexual Activity   • Alcohol use: Yes     Alcohol/week: 6.0 standard drinks     Types: 3 Glasses of wine, 3 Cans of beer per week     Comment: Usually have a glass of wine or a beer with dinner   • Drug use: Never   • Sexual activity: Yes     Partners: Female     Birth control/protection: None        Review of Systems     Objective   Vital Signs:   Ht 165.1 cm (65\")   Wt 77.1 kg (170 lb)   BMI 28.29 kg/m²       Physical Exam  Constitutional:       Appearance: Normal appearance. The patient is well-developed and normal weight.   HENT:      Head: Normocephalic.      Right Ear: Hearing and external ear normal.      Left Ear: Hearing and external ear normal.      Nose: Nose normal.   Eyes:      Conjunctiva/sclera: Conjunctivae normal.   Cardiovascular:      Rate and Rhythm: Normal rate.   Pulmonary:      Effort: Pulmonary effort is normal.      Breath sounds: No wheezing or rales.   Abdominal:      Palpations: Abdomen is soft.      Tenderness: There is no abdominal tenderness.   Musculoskeletal:      Cervical back: Normal range of motion.   Skin:     Findings: No rash.   Neurological:      Mental Status: The patient " "is alert and oriented to person, place, and time.   Psychiatric:         Mood and Affect: Mood and affect normal.         Judgment: Judgment normal.       Ortho Exam      Right knee- Stable to varus/valgus stress. Stable to anterior/posterior drawer. Negative Rayne's. Negative Lachman's. Positive EHL, FHL, GS and TA. Sensation intact to all 5 nerves of the foot. Positive pulses. Neurovascularly intact. Good strength to hamstrings, quadriceps, dorsiflexors, and plantar flexors.  ROM 0-135 degrees. No medial joint line tenderness. Tender to the lateral joint line.      Procedures      Imaging Results (Most Recent)     None           Result Review :       XR Knee 1 or 2 View Right    Result Date: 2/9/2022  Narrative: PROCEDURE: XR KNEE 1 OR 2 VW RIGHT  COMPARISON: None available.  INDICATIONS: Injury yesterday with \"pop\" while turning and walking, pain developed along inferior lateral knee and is worse with weightbearing.  No effusion on exam.  FINDINGS:  No evidence of acute fracture or dislocation.  The joint spaces are well maintained.  Moderate joint effusion.      Impression:  Moderate joint effusion, without radiographic evidence of acute fracture or dislocation.      GERARD BAZZI MD       Electronically Signed and Approved By: GERARD BAZZI MD on 2/09/2022 at 13:22             MRI Knee Right Without Contrast    Result Date: 3/8/2022  Narrative: PROCEDURE: MRI KNEE RIGHT  WO CONTRAST  COMPARISON: [facility], , XR KNEE 1 OR 2 VW RIGHT, 2/09/2022, 12:14.  INDICATIONS: RIGHT ANTERIOLATERAL KNEE PAIN. POST PLAYING RACQUETBALL ~ 3 WEEKS AGO.  TECHNIQUE: A complete multi-planar MRI was performed.   FINDINGS:  SOFT TISSUES:  Small joint effusion with mild diffuse synovial thickening.  Small 3 mm nodular area of synovial thickening in the posterior lateral knee joint space versus a small intra-articular body.  Mild nonspecific edema in the lateral infrapatellar fat pad.  No popliteal cyst.  Mild " nonspecific prepatellar soft tissue edema.  No solid soft tissue mass.  MENISCI: The medial meniscus is intact.  Partial-thickness curvilinear radial tear through the lateral meniscus anterior body.  No displaced meniscal fragment.  CRUCIATE LIGAMENTS: The ACL is intact. The PCL is intact.  COLLATERAL LIGAMENTS: The MCL is intact. The LCL complex is intact.  EXTENSOR MECHANISM: The quadriceps tendon is intact. The patellar tendon is intact.  ARTICULAR CARTILAGE:  The articular cartilage is well maintained in all 3 compartments.  BONES:  No fracture.  No concerning bone marrow lesion or marrow replacing process.      Impression:   1. Partial-thickness curvilinear radial tear through the lateral meniscus anterior body.  No displaced meniscal fragment. 2. Small joint effusion with nodular synovial thickening or small intra-articular body in the posterior lateral knee joint space. 3. Mild nonspecific edema in the lateral infrapatellar fat pad, which could be reactive or reflect fat pad impingement.     GERARD BAZZI MD       Electronically Signed and Approved By: GERARD BAZZI MD on 3/08/2022 at 9:12                      Assessment and Plan     DX: Right knee lateral meniscus tear.      Discussed the treatment options with the patient, operative vs non-operative. Discussed the risks and benefits of a right knee arthroscopic Partial lateral meniscectomy. The patient expressed understanding and wished to proceed.     Discussed surgery., Risks/benefits discussed with patient including, but not limited to: infection, bleeding, neurovascular damage, malunion, nonunion, aesthetic deformity, need for further surgery, and death., Discussed with patient the implant type being used during surgery and patient understands and desires to proceed., Surgery pamphlet given. and Call or return if worsening symptoms.    Follow Up     2 weeks postoperatively.        Patient was given instructions and counseling regarding his condition or  for health maintenance advice. Please see specific information pulled into the AVS if appropriate.     Scribed for Dung Petersen MD by Aliya Fagan.  03/11/22   08:04 EST    I have personally performed the services described in this document as scribed by the above individual and it is both accurate and complete. Dung Petersen MD 03/13/22

## 2022-03-15 NOTE — PRE-PROCEDURE INSTRUCTIONS
PATIENT INSTRUCTED TO BE:    - NPO AFTER MIDNIGHT EXCEPT CAN HAVE CLEAR LIQUIDS 2 HOURS PRIOR TO SURGERY ARRIVAL TIME     - TO HOLD ALL VITAMINS, SUPPLEMENTS, NSAIDS FOR ONE WEEK PRIOR TO THEIR SURGICAL PROCEDURE    - INSTRUCTED PT TO USE SURGICAL SOAP 1 TIME THE NIGHT PRIOR TO SURGERY OR THE AM OF SURGERY.   USE SOAP FROM NECK TO TOES AVOID THEIR FACE, HAIR, AND PRIVATE PARTS. INSTRUCTED NO LOTIONS, JEWELRY, PIERCINGS, OR DEODORANT DAY OF SURGERY    - IF DIABETIC, CHECK BLOOD GLUCOSE IF LESS THAN 70 CALL PREOP AREA -AM OF SURGERY     - INSTRUCTED TO TAKE THE FOLLOWING MEDICATIONS THE DAY OF SURGERY:       TYLENOL WITH CODEINE IF NEEDED     PATIENT VERBALIZED UNDERSTANDING

## 2022-03-21 ENCOUNTER — ANESTHESIA (OUTPATIENT)
Dept: PERIOP | Facility: HOSPITAL | Age: 52
End: 2022-03-21

## 2022-03-21 ENCOUNTER — ANESTHESIA EVENT (OUTPATIENT)
Dept: PERIOP | Facility: HOSPITAL | Age: 52
End: 2022-03-21

## 2022-03-21 ENCOUNTER — HOSPITAL ENCOUNTER (OUTPATIENT)
Facility: HOSPITAL | Age: 52
Setting detail: HOSPITAL OUTPATIENT SURGERY
Discharge: HOME | End: 2022-03-21
Attending: ORTHOPAEDIC SURGERY | Admitting: ORTHOPAEDIC SURGERY

## 2022-03-21 VITALS
WEIGHT: 178.79 LBS | BODY MASS INDEX: 29.79 KG/M2 | SYSTOLIC BLOOD PRESSURE: 122 MMHG | HEART RATE: 43 BPM | RESPIRATION RATE: 18 BRPM | OXYGEN SATURATION: 100 % | HEIGHT: 65 IN | TEMPERATURE: 97 F | DIASTOLIC BLOOD PRESSURE: 85 MMHG

## 2022-03-21 DIAGNOSIS — S83.281A TEAR OF LATERAL MENISCUS OF RIGHT KNEE, UNSPECIFIED TEAR TYPE, UNSPECIFIED WHETHER OLD OR CURRENT TEAR, INITIAL ENCOUNTER: ICD-10-CM

## 2022-03-21 LAB — QT INTERVAL: 418 MS

## 2022-03-21 PROCEDURE — 29881 ARTHRS KNE SRG MNISECTMY M/L: CPT | Performed by: ORTHOPAEDIC SURGERY

## 2022-03-21 PROCEDURE — 25010000002 CEFAZOLIN IN DEXTROSE 2-4 GM/100ML-% SOLUTION: Performed by: ORTHOPAEDIC SURGERY

## 2022-03-21 PROCEDURE — 25010000002 FENTANYL CITRATE (PF) 50 MCG/ML SOLUTION: Performed by: NURSE ANESTHETIST, CERTIFIED REGISTERED

## 2022-03-21 PROCEDURE — 93005 ELECTROCARDIOGRAM TRACING: CPT | Performed by: STUDENT IN AN ORGANIZED HEALTH CARE EDUCATION/TRAINING PROGRAM

## 2022-03-21 PROCEDURE — 25010000002 ONDANSETRON PER 1 MG: Performed by: NURSE ANESTHETIST, CERTIFIED REGISTERED

## 2022-03-21 PROCEDURE — 25010000002 MORPHINE PER 10 MG: Performed by: ORTHOPAEDIC SURGERY

## 2022-03-21 PROCEDURE — 25010000002 PROPOFOL 10 MG/ML EMULSION: Performed by: NURSE ANESTHETIST, CERTIFIED REGISTERED

## 2022-03-21 PROCEDURE — 93010 ELECTROCARDIOGRAM REPORT: CPT | Performed by: INTERNAL MEDICINE

## 2022-03-21 PROCEDURE — 25010000002 MIDAZOLAM PER 1 MG: Performed by: ANESTHESIOLOGY

## 2022-03-21 PROCEDURE — 25010000002 DEXAMETHASONE PER 1 MG: Performed by: NURSE ANESTHETIST, CERTIFIED REGISTERED

## 2022-03-21 RX ORDER — PROMETHAZINE HYDROCHLORIDE 12.5 MG/1
25 TABLET ORAL ONCE AS NEEDED
Status: DISCONTINUED | OUTPATIENT
Start: 2022-03-21 | End: 2022-03-21 | Stop reason: HOSPADM

## 2022-03-21 RX ORDER — PROMETHAZINE HYDROCHLORIDE 25 MG/1
25 SUPPOSITORY RECTAL ONCE AS NEEDED
Status: DISCONTINUED | OUTPATIENT
Start: 2022-03-21 | End: 2022-03-21 | Stop reason: HOSPADM

## 2022-03-21 RX ORDER — DEXAMETHASONE SODIUM PHOSPHATE 4 MG/ML
INJECTION, SOLUTION INTRA-ARTICULAR; INTRALESIONAL; INTRAMUSCULAR; INTRAVENOUS; SOFT TISSUE AS NEEDED
Status: DISCONTINUED | OUTPATIENT
Start: 2022-03-21 | End: 2022-03-21 | Stop reason: SURG

## 2022-03-21 RX ORDER — SODIUM CHLORIDE, SODIUM LACTATE, POTASSIUM CHLORIDE, CALCIUM CHLORIDE 600; 310; 30; 20 MG/100ML; MG/100ML; MG/100ML; MG/100ML
9 INJECTION, SOLUTION INTRAVENOUS CONTINUOUS PRN
Status: DISCONTINUED | OUTPATIENT
Start: 2022-03-21 | End: 2022-03-21 | Stop reason: HOSPADM

## 2022-03-21 RX ORDER — MIDAZOLAM HYDROCHLORIDE 1 MG/ML
2 INJECTION INTRAMUSCULAR; INTRAVENOUS ONCE
Status: COMPLETED | OUTPATIENT
Start: 2022-03-21 | End: 2022-03-21

## 2022-03-21 RX ORDER — ACETAMINOPHEN 500 MG
1000 TABLET ORAL ONCE
Status: COMPLETED | OUTPATIENT
Start: 2022-03-21 | End: 2022-03-21

## 2022-03-21 RX ORDER — MEPERIDINE HYDROCHLORIDE 25 MG/ML
12.5 INJECTION INTRAMUSCULAR; INTRAVENOUS; SUBCUTANEOUS
Status: DISCONTINUED | OUTPATIENT
Start: 2022-03-21 | End: 2022-03-21 | Stop reason: HOSPADM

## 2022-03-21 RX ORDER — FENTANYL CITRATE 50 UG/ML
INJECTION, SOLUTION INTRAMUSCULAR; INTRAVENOUS AS NEEDED
Status: DISCONTINUED | OUTPATIENT
Start: 2022-03-21 | End: 2022-03-21 | Stop reason: SURG

## 2022-03-21 RX ORDER — PROPOFOL 10 MG/ML
VIAL (ML) INTRAVENOUS AS NEEDED
Status: DISCONTINUED | OUTPATIENT
Start: 2022-03-21 | End: 2022-03-21 | Stop reason: SURG

## 2022-03-21 RX ORDER — EPHEDRINE SULFATE 50 MG/ML
INJECTION, SOLUTION INTRAVENOUS AS NEEDED
Status: DISCONTINUED | OUTPATIENT
Start: 2022-03-21 | End: 2022-03-21 | Stop reason: SURG

## 2022-03-21 RX ORDER — MAGNESIUM HYDROXIDE 1200 MG/15ML
LIQUID ORAL AS NEEDED
Status: DISCONTINUED | OUTPATIENT
Start: 2022-03-21 | End: 2022-03-21 | Stop reason: HOSPADM

## 2022-03-21 RX ORDER — LIDOCAINE HYDROCHLORIDE 20 MG/ML
INJECTION, SOLUTION INFILTRATION; PERINEURAL AS NEEDED
Status: DISCONTINUED | OUTPATIENT
Start: 2022-03-21 | End: 2022-03-21 | Stop reason: SURG

## 2022-03-21 RX ORDER — MORPHINE SULFATE 1 MG/ML
INJECTION, SOLUTION EPIDURAL; INTRATHECAL; INTRAVENOUS AS NEEDED
Status: DISCONTINUED | OUTPATIENT
Start: 2022-03-21 | End: 2022-03-21 | Stop reason: HOSPADM

## 2022-03-21 RX ORDER — HYDROCODONE BITARTRATE AND ACETAMINOPHEN 7.5; 325 MG/1; MG/1
1-2 TABLET ORAL EVERY 4 HOURS PRN
Qty: 36 TABLET | Refills: 0 | Status: SHIPPED | OUTPATIENT
Start: 2022-03-21 | End: 2022-04-19

## 2022-03-21 RX ORDER — OXYCODONE HYDROCHLORIDE 5 MG/1
5 TABLET ORAL
Status: DISCONTINUED | OUTPATIENT
Start: 2022-03-21 | End: 2022-03-21 | Stop reason: HOSPADM

## 2022-03-21 RX ORDER — ASPIRIN 325 MG
325 TABLET, DELAYED RELEASE (ENTERIC COATED) ORAL DAILY
Qty: 14 TABLET | Refills: 0 | Status: SHIPPED | OUTPATIENT
Start: 2022-03-21 | End: 2022-04-19

## 2022-03-21 RX ORDER — ONDANSETRON 2 MG/ML
INJECTION INTRAMUSCULAR; INTRAVENOUS AS NEEDED
Status: DISCONTINUED | OUTPATIENT
Start: 2022-03-21 | End: 2022-03-21 | Stop reason: SURG

## 2022-03-21 RX ORDER — ONDANSETRON 2 MG/ML
4 INJECTION INTRAMUSCULAR; INTRAVENOUS ONCE AS NEEDED
Status: DISCONTINUED | OUTPATIENT
Start: 2022-03-21 | End: 2022-03-21 | Stop reason: HOSPADM

## 2022-03-21 RX ORDER — CEFAZOLIN SODIUM 2 G/100ML
2 INJECTION, SOLUTION INTRAVENOUS ONCE
Status: COMPLETED | OUTPATIENT
Start: 2022-03-21 | End: 2022-03-21

## 2022-03-21 RX ORDER — BUPIVACAINE HYDROCHLORIDE 5 MG/ML
INJECTION, SOLUTION EPIDURAL; INTRACAUDAL AS NEEDED
Status: DISCONTINUED | OUTPATIENT
Start: 2022-03-21 | End: 2022-03-21 | Stop reason: HOSPADM

## 2022-03-21 RX ORDER — GLYCOPYRROLATE 0.2 MG/ML
0.2 INJECTION INTRAMUSCULAR; INTRAVENOUS
Status: COMPLETED | OUTPATIENT
Start: 2022-03-21 | End: 2022-03-21

## 2022-03-21 RX ADMIN — ACETAMINOPHEN 1000 MG: 500 TABLET ORAL at 07:01

## 2022-03-21 RX ADMIN — SODIUM CHLORIDE, POTASSIUM CHLORIDE, SODIUM LACTATE AND CALCIUM CHLORIDE 9 ML/HR: 600; 310; 30; 20 INJECTION, SOLUTION INTRAVENOUS at 07:02

## 2022-03-21 RX ADMIN — LIDOCAINE HYDROCHLORIDE 100 MG: 20 INJECTION, SOLUTION INFILTRATION; PERINEURAL at 08:30

## 2022-03-21 RX ADMIN — EPHEDRINE SULFATE 25 MG: 50 INJECTION INTRAVENOUS at 08:37

## 2022-03-21 RX ADMIN — DEXAMETHASONE SODIUM PHOSPHATE 4 MG: 4 INJECTION INTRA-ARTICULAR; INTRALESIONAL; INTRAMUSCULAR; INTRAVENOUS; SOFT TISSUE at 08:41

## 2022-03-21 RX ADMIN — MIDAZOLAM HYDROCHLORIDE 2 MG: 1 INJECTION, SOLUTION INTRAMUSCULAR; INTRAVENOUS at 08:10

## 2022-03-21 RX ADMIN — FENTANYL CITRATE 100 MCG: 50 INJECTION, SOLUTION INTRAMUSCULAR; INTRAVENOUS at 08:30

## 2022-03-21 RX ADMIN — GLYCOPYRROLATE 0.2 MG: 0.2 INJECTION, SOLUTION INTRAMUSCULAR; INTRAVENOUS at 08:10

## 2022-03-21 RX ADMIN — CEFAZOLIN SODIUM 2 G: 2 INJECTION, SOLUTION INTRAVENOUS at 08:29

## 2022-03-21 RX ADMIN — ONDANSETRON 4 MG: 2 INJECTION INTRAMUSCULAR; INTRAVENOUS at 08:41

## 2022-03-21 RX ADMIN — PROPOFOL 200 MG: 10 INJECTION, EMULSION INTRAVENOUS at 08:30

## 2022-03-21 NOTE — PROGRESS NOTES
KNEE ARTHROSCOPY  Procedure Report    Patient Name:  Noel Mueller  YOB: 1970    Date of Surgery:  3/21/2022     Indications: The patient has failed conservative treatment and wishes to proceed with operative treatment.  We discussed the risk of surgery including bleeding, infection, damage to neurovascular structures, anesthesia complications, continued pain and disability, need for additional procedures among others.  Informed consent was obtained and they wished to proceed.    Pre-op Diagnosis:   Tear of lateral meniscus of right knee, unspecified tear type, unspecified whether old or current tear, initial encounter [S83.281A]       Post-Op Diagnosis Codes:     * Tear of lateral meniscus of right knee, unspecified tear type, unspecified whether old or current tear, initial encounter [S83.281A]    Procedure/CPT® Codes:      Procedure(s):  RIGHT KNEE ARTHROSCOPY WITH PARTIAL LATERAL MENISCECTOMY, AND CHONDROPLASTY    Staff:  Surgeon(s):  Dung Petersen MD         Anesthesia: General    Estimated Blood Loss: 3 mL    Implants:    Nothing was implanted during the procedure    Specimen:          None        Findings: Lateral meniscal tear, grade II chondromalacia lateral femoral condyle, effusion    Complications: None    Description of Procedure: Operative site was marked in the preoperative holding area.  The patient was brought to the operating room and placed supine on the operating room table.  General anesthesia was given.  All bony prominences were padded.  The operative leg had a nonsterile tourniquet placed on the thigh and was placed in arthroscopic leg awad.  The opposite leg was placed in a padded leg awad and the foot of the bed was lowered.      The patient received preoperative antibiotic.  After formal timeout was held, Esmarch was used to exsanguinate the limb and tourniquet was inflated.  A stab incision was made over the anterolateral aspect of the knee and a trocar was  inserted into the knee.  The knee was extended, and diagnostic arthroscopy was performed. Anteromedial portal was made with the spinal needle from outside in.  A stab incision was made.  An arthroscopic shaver was inserted into the knee and the knee was débrided.  There was a knee effusion, 20 cc which was evacuated from the knee.  No signs of infection.    Patellofemoral inspection revealed intact patellofemoral joint.  No significant chondromalacia to the patella or the trochlea.  No loose bodies were noted.  No significant patellar maltracking.     The knee was flexed.  The valgus stress was placed to the knee.  A probe was inserted into the medial compartment.  Medial compartment exam revealed intact medial meniscus.  No evidence of significant chondral abnormalities to the medial compartment.      At this point attention was turned to the notch in the femur.  The ACL was probed and found to be stable. The PCL was intact.     The lateral compartment findings included chondral damage to the lateral femoral condyle witha radial tear to the body of the lateral meniscus.  This was removed with a straight meniscal biter as well as a motorized shaver for a partial lateral meniscectomy.  There was grade II-III chondromalacia to the lateral femoral condyle.  This was debrided with a motorized shaver for a chondroplasty.    At this point then fluid was drained from the knee.  Tourniquet was released.  The incisions were closed with 3-0 nylon in figure-of-8 fashion.  Local anesthetic, 10 mL of Marcaine and 10 mg of morphine, were injected into the knee.  Xeroform, 4x4s, soft roll and an Ace wrap were placed.  The patient awoke from anesthesia in stable condition.  There were no complications.  All counts were correct.  The patient was stable to recovery.           Dung Petersen MD     Date: 3/21/2022  Time: 09:11 EDT

## 2022-03-21 NOTE — OP NOTE
KNEE ARTHROSCOPY  Procedure Report    Patient Name:  Noel Mueller  YOB: 1970    Date of Surgery:  3/21/2022     Indications: The patient has failed conservative treatment and wishes to proceed with operative treatment.  We discussed the risk of surgery including bleeding, infection, damage to neurovascular structures, anesthesia complications, continued pain and disability, need for additional procedures among others.  Informed consent was obtained and they wished to proceed.    Pre-op Diagnosis:   Tear of lateral meniscus of right knee, unspecified tear type, unspecified whether old or current tear, initial encounter [S83.281A]       Post-Op Diagnosis Codes:     * Tear of lateral meniscus of right knee, unspecified tear type, unspecified whether old or current tear, initial encounter [S83.281A]    Right knee chondromalacia  Procedure/CPT® Codes:      Procedure(s):  RIGHT KNEE ARTHROSCOPY WITH PARTIAL LATERAL MENISCECTOMY, AND CHONDROPLASTY    Staff:  Surgeon(s):  Dung Petersen MD         Anesthesia: General    Estimated Blood Loss: 3 mL    Implants:    Nothing was implanted during the procedure    Specimen:          None        Findings: Lateral meniscal tear.  Grade II chondromalacia to III chondromalacia lateral femoral condyle.    Complications: None    Description of Procedure: Operative site was marked in the preoperative holding area.  The patient was brought to the operating room and placed supine on the operating room table.  General anesthesia was given.  All bony prominences were padded.  The operative leg had a nonsterile tourniquet placed on the thigh and was placed in arthroscopic leg awad.  The opposite leg was placed in a padded leg waad and the foot of the bed was lowered.      The patient received preoperative antibiotic.  After formal timeout was held, Esmarch was used to exsanguinate the limb and tourniquet was inflated.  A stab incision was made over the  anterolateral aspect of the knee and a trocar was inserted into the knee.  There was a knee effusion which was aspirated.  20 cc of clear joint fluid was aspirated with no signs of infection.  The knee was extended, and diagnostic arthroscopy was performed. Anteromedial portal was made with the spinal needle from outside in.  A stab incision was made.  An arthroscopic shaver was inserted into the knee and the knee was débrided.      Patellofemoral inspection revealed normal patellofemoral joint.  There was no evidence of lateral patellar tilt or maltracking.  No significant patellofemoral chondromalacia.     The knee was flexed.  The valgus stress was placed to the knee.  A probe was inserted into the medial compartment.  Medial compartment exam revealed intact medial meniscus.  Intact medial tibiofemoral cartilage without significant cartilage wear.     At this point attention was turned to the notch in the femur.  The ACL was probed and found to be stable. The PCL was intact.     The lateral compartment findings included a radial tear of the body of the lateral meniscus.  This was debrided with a straight meniscal biter as well as a motorized shaver for a partial lateral meniscectomy.  There is also grade II-III chondromalacia along the lateral femoral condyle which was debrided with a motorized shaver for a chondroplasty.    At this point then fluid was drained from the knee.  Tourniquet was released.  The incisions were closed with 3-0 nylon in figure-of-8 fashion.  Local anesthetic, 10 mL of Marcaine and 10 mg of morphine, were injected into the knee.  Xeroform, 4x4s, soft roll and an Ace wrap were placed.  The patient awoke from anesthesia in stable condition.  There were no complications.  All counts were correct.  The patient was stable to recovery.           Dung Petersen MD     Date: 3/21/2022  Time: 10:11 EDT

## 2022-03-21 NOTE — ANESTHESIA POSTPROCEDURE EVALUATION
Patient: Noel Mueller    Procedure Summary     Date: 03/21/22 Room / Location: [facility] OR [address] [place] [address] OR [facility]    Anesthesia Start: 0825 Anesthesia Stop: 0916    Procedure: RIGHT KNEE ARTHROSCOPY WITH PARTIAL LATERAL MENISCECTOMY, AND CHONDROPLASTY (Right Knee) Diagnosis:       Tear of lateral meniscus of right knee, unspecified tear type, unspecified whether old or current tear, initial encounter      (Tear of lateral meniscus of right knee, unspecified tear type, unspecified whether old or current tear, initial encounter [S83.281A])    Surgeons: Dung Petersen MD Provider: Adilson Bradley MD    Anesthesia Type: general ASA Status: 2          Anesthesia Type: general    Vitals  Vitals Value Taken Time   /62 03/21/22 0925   Temp 35.8 °C (96.5 °F) 03/21/22 0913   Pulse 52 03/21/22 0929   Resp 14 03/21/22 0913   SpO2 97 % 03/21/22 0929   Vitals shown include unvalidated device data.        Post Anesthesia Care and Evaluation    Patient location during evaluation: bedside  Patient participation: complete - patient participated  Level of consciousness: awake, responsive to light touch, responsive to noxious stimuli, responsive to painful stimuli, responsive to physical stimuli and responsive to verbal stimuli  Pain score: 2  Pain management: adequate  Airway patency: patent  Anesthetic complications: No anesthetic complications  PONV Status: none  Cardiovascular status: acceptable and stable  Respiratory status: acceptable and nasal cannula  Hydration status: acceptable    Comments: An Anesthesiologist personally participated in the most demanding procedures (including induction and emergence if applicable) in the anesthesia plan, monitored the course of anesthesia administration at frequent intervals and remained physically present and available for immediate diagnosis and treatment of emergencies.

## 2022-03-21 NOTE — ANESTHESIA PREPROCEDURE EVALUATION
Anesthesia Evaluation     Patient summary reviewed and Nursing notes reviewed   no history of anesthetic complications:  NPO Solid Status: > 8 hours  NPO Liquid Status: > 2 hours           Airway   Mallampati: I  TM distance: >3 FB  Neck ROM: full  No difficulty expected  Dental      Pulmonary - negative pulmonary ROS and normal exam    breath sounds clear to auscultation  Cardiovascular - normal exam  Exercise tolerance: good (4-7 METS)    Rhythm: regular  Rate: normal    (+) hyperlipidemia,       Neuro/Psych- negative ROS  GI/Hepatic/Renal/Endo - negative ROS     Musculoskeletal     (+) neck pain,   Abdominal    Substance History - negative use     OB/GYN negative ob/gyn ROS         Other - negative ROS                       Anesthesia Plan    ASA 2     general   (Patient understands anesthesia not responsible for dental damage.)  intravenous induction     Anesthetic plan, all risks, benefits, and alternatives have been provided, discussed and informed consent has been obtained with: patient.  Use of blood products discussed with patient .   Plan discussed with CRNA.        CODE STATUS:

## 2022-03-24 ENCOUNTER — TREATMENT (OUTPATIENT)
Dept: PHYSICAL THERAPY | Facility: CLINIC | Age: 52
End: 2022-03-24

## 2022-03-24 DIAGNOSIS — R26.2 DIFFICULTY WALKING: ICD-10-CM

## 2022-03-24 DIAGNOSIS — Z47.89 SURGICAL AFTERCARE, MUSCULOSKELETAL SYSTEM: ICD-10-CM

## 2022-03-24 DIAGNOSIS — M25.561 RIGHT KNEE PAIN, UNSPECIFIED CHRONICITY: ICD-10-CM

## 2022-03-24 DIAGNOSIS — Z98.890 S/P ARTHROSCOPIC PARTIAL LATERAL MENISCECTOMY: Primary | ICD-10-CM

## 2022-03-24 PROCEDURE — 97161 PT EVAL LOW COMPLEX 20 MIN: CPT | Performed by: PHYSICAL THERAPIST

## 2022-03-24 PROCEDURE — 97110 THERAPEUTIC EXERCISES: CPT | Performed by: PHYSICAL THERAPIST

## 2022-03-24 NOTE — PROGRESS NOTES
Physical Therapy Initial Evaluation and Plan of Care    Patient: Noel Mueller   : 1970  Diagnosis/ICD-10 Code:  S/P arthroscopic partial lateral meniscectomy [Z98.890]  Referring practitioner: Dung Petersen MD  Date of Initial Visit: 3/24/2022  Today's Date: 3/24/2022  Patient seen for 1 sessions           Subjective Questionnaire: LEFS: 46/80 = 57.5% Function      Subjective Evaluation    History of Present Illness  Mechanism of injury: The patient presents to physical therapy s/p right knee partial lateral meniscectomy and chondroplasty on 3/21/22. He initially injured his knee playing [employer] in February. He has not needed to use crutches since surgery. He has only taken hydrocodone 3 times since surgery. He is using ice primarily for pain management. His pain is increased with bending his knee and going up/down stairs. He hasn't tried to do any athletic activity since surgery. 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 He denies any numbness/tingling.    Pain  Current pain rating: 3  At best pain ratin  At worst pain rating: 3    Patient Goals  Patient goal: The patient would like to return to running, weight training, and playing [employer].           Objective          Observations     Additional Knee Observation Details  Incision sites on right knee healing well with no signs of infection.    Neurological Testing     Sensation     Knee   Left Knee   Intact: light touch    Right Knee   Intact: light touch     Active Range of Motion     Right Knee   Flexion: 118 degrees   Extension: 0 degrees     Passive Range of Motion     Right Knee   Flexion: 130 degrees   Extension: 0 degrees     Patellar Mobility     Right Knee Patellar tendons within functional limits include the medial, lateral, superior and inferior.     Strength/Myotome Testing     Left Hip   Planes of Motion   Flexion: 5  Extension: 5  Abduction: 5    Right Hip   Planes of Motion   Flexion:  4  Extension: 4+  Abduction: 4+    Left Knee   Flexion: 5  Extension: 5  Quadriceps contraction: good    Right Knee   Flexion: 4+  Extension: 4+  Quadriceps contraction: good    Ambulation     Ambulation: Level Surfaces     Additional Level Surfaces Ambulation Details  The patient ambulates with normal biomechanics of gait.      See Exercise, Manual, and Modality Logs for complete treatment.     Assessment & Plan     Assessment  Impairments: abnormal or restricted ROM, activity intolerance, impaired physical strength, lacks appropriate home exercise program and pain with function  Functional Limitations: walking, uncomfortable because of pain, standing and stooping  Assessment details: The patient presents to physical therapy s/p right partial lateral meniscectomy and chondroplasty on 3/21/22 with complaints of right knee pain, right knee weakness, right hip weakness, and functional deficits (LEFS). He would benefti from skilled PT as described below to address these limitations and to allow the patient to return to his prior level of function.  Prognosis: good    Goals  Plan Goals: KNEE PROBLEMS:     1. The patient has limited ROM of the right knee.   LTG 1: 12 weeks:  The patient will demonstrate 0 to 135 degrees of ROM for the right knee in order to allow patient to ambulate and perform mobility related ADLs.    STATUS:  New   STG 1a: 6 weeks:  The patient will demonstrate 0 to 130 degrees of ROM for the right knee.    STATUS:  New   TREATMENT: Manual therapy, therapeutic exercise, home exercise instruction, and modalities as needed to include:  moist heat, electrical stimulation, ultrasound, and ice.    2. The patient has limited strength of the right knee.   LTG 2: 12 weeks: The patient will demonstrate 5 /5 strength for right knee flexion and extension in order to allow patient improved joint stability    STATUS:  New   TREATMENT: Manual therapy, therapeutic exercise, home exercise instruction, aquatic  therapy, and modalities as needed to include:  moist heat, electrical stimulation, ultrasound, and ice.     3. The patient has limited strength of the right hip.   LTG 3: 12 weeks: The patient will demonstrate 5 /5 strength for right hip flexion, abduction, and extension in order to allow patient improved joint stability    STATUS:  New   TREATMENT: Manual therapy, therapeutic exercise, home exercise instruction, aquatic therapy, and modalities as needed to include:  moist heat, electrical stimulation, ultrasound, and ice.    4. Mobility: Walking/Moving Around Functional Limitation     LTG 4: 12 weeks:  The patient will demonstrate 0 % limitation by achieving a score of 80/80 on the LEFS.    STATUS:  New   STG 4 a: 6 weeks:  The patient will demonstrate 25 % limitation by achieving a score of 60/80 on the LEFS.      STATUS:  New   TREATMENT:  Manual therapy, therapeutic exercise, home exercise instruction, and modalities as needed to include: moist heat, electrical stimulation, and ultrasound.    5. The patient is unable to run.    LTG 5: 12 weeks:  The patient will be able to run 1 mile in 10 minutes or less with proper biomechanics.    STATUS:  New   STG 5 a: 6 weeks:  The patient will be able to run 1/2 mile in 5 minutes or less with proper biomechanics.    STATUS:  New   TREATMENT:  Manual therapy, therapeutic exercise, home exercise instruction, and modalities as needed to include: moist heat, electrical stimulation, and ultrasound.      Plan  Therapy options: will be seen for skilled therapy services  Planned modality interventions: cryotherapy, electrical stimulation/Russian stimulation and TENS  Planned therapy interventions: balance/weight-bearing training, ADL retraining, soft tissue mobilization, strengthening, stretching, therapeutic activities, joint mobilization, home exercise program, gait training, functional ROM exercises, flexibility, body mechanics training, postural training, neuromuscular  re-education and manual therapy  Other planned therapy interventions: Aquatic Therapy  Frequency: 2x week  Duration in weeks: 12  Treatment plan discussed with: patient        Visit Diagnoses:    ICD-10-CM ICD-9-CM   1. S/P arthroscopic partial lateral meniscectomy  Z98.890 V45.89   2. Right knee pain, unspecified chronicity  M25.561 719.46   3. Surgical aftercare, musculoskeletal system  Z47.89 V58.78   4. Difficulty walking  R26.2 719.7       History # of Personal Factors and/or Comorbidities: LOW (0)  Examination of Body System(s): # of elements: LOW (1-2)  Clinical Presentation: STABLE   Clinical Decision Making: LOW       Timed:         Manual Therapy:    0     mins  73044;     Therapeutic Exercise:    8     mins  90897;     Neuromuscular Renu:    0    mins  81486;    Therapeutic Activity:     0     mins  57700;     Gait Trainin     mins  67571;     Ultrasound:     0     mins  21464;    Ionto                               0    mins   77334  Self Care                       0     mins   00043  Canalith Repos    0     mins 76310      Un-Timed:  Electrical Stimulation:    0     mins  03007 ( );  Dry Needling     0     mins self-pay  Traction     0     mins 54660  Low Eval     30     Mins  31400  Mod Eval     0     Mins  70791  High Eval                       0     Mins  99652  Re-Eval                           0    mins  52217    Timed Treatment:   8   mins   Total Treatment:     38   mins    PT SIGNATURE: Otf Ibrahim PT    Electronically signed 3/24/2022    [place] License: PT - [id number]      Initial Certification  Certification Period: 3/24/2022 thru 2022  I certify that the therapy services are furnished while this patient is under my care.  The services outlined above are required by this patient, and will be reviewed every 90 days.     PHYSICIAN: Dung Petersen MD  NPI: [id number]      DATE:     Please sign and return via fax to [phone]. Thank you, [facility].

## 2022-03-31 ENCOUNTER — TREATMENT (OUTPATIENT)
Dept: PHYSICAL THERAPY | Facility: CLINIC | Age: 52
End: 2022-03-31

## 2022-03-31 DIAGNOSIS — R26.2 DIFFICULTY WALKING: ICD-10-CM

## 2022-03-31 DIAGNOSIS — M25.561 RIGHT KNEE PAIN, UNSPECIFIED CHRONICITY: ICD-10-CM

## 2022-03-31 DIAGNOSIS — Z47.89 SURGICAL AFTERCARE, MUSCULOSKELETAL SYSTEM: ICD-10-CM

## 2022-03-31 DIAGNOSIS — Z98.890 S/P ARTHROSCOPIC PARTIAL LATERAL MENISCECTOMY: Primary | ICD-10-CM

## 2022-03-31 PROCEDURE — 97110 THERAPEUTIC EXERCISES: CPT | Performed by: PHYSICAL THERAPIST

## 2022-03-31 PROCEDURE — 97530 THERAPEUTIC ACTIVITIES: CPT | Performed by: PHYSICAL THERAPIST

## 2022-03-31 NOTE — PROGRESS NOTES
Physical Therapy Daily Progress Note    Patient: Noel Mueller   : 1970  Diagnosis/ICD-10 Code:  S/P arthroscopic partial lateral meniscectomy [Z98.890]  Referring practitioner: Dung Petersen MD  Date of Initial Visit: Type: THERAPY  Noted: 3/24/2022  Today's Date: 3/31/2022  Patient seen for 2 sessions           Subjective   The patient reported that he has been complaint with this HEP. He has been working out some at the gym and riding his stationary bike at home.    Objective   See Exercise, Manual, and Modality Logs for complete treatment.     Assessment/Plan  The patient demonstrated excellent tolerance to all functional lower extremity strengthening exercise. All exercises were performed with minimal reproduction of pain. Continue to progress per patient tolerance.       Timed:  Manual Therapy:    0     mins  37712;  Therapeutic Exercise:    23     mins  97289;     Neuromuscular Renu:   0    mins  78080;    Therapeutic Activity:     15     mins  53134;     Gait Trainin     mins  18042;     Aquatics                         0      mins  94475    Un-timed:  Mechanical Traction      0     mins  52112  Dry Needling     0     mins self-pay  Electrical Stimulation:    0     mins  75816 ( );      Timed Treatment:   38   mins   Total Treatment:     38   mins    Otf Ibrahim PT  Physical Therapist    Electronically signed 3/31/2022    [place] License: PT - [id number]

## 2022-04-05 ENCOUNTER — OFFICE VISIT (OUTPATIENT)
Dept: ORTHOPEDIC SURGERY | Facility: CLINIC | Age: 52
End: 2022-04-05

## 2022-04-05 VITALS — BODY MASS INDEX: 28.32 KG/M2 | HEIGHT: 65 IN | WEIGHT: 170 LBS

## 2022-04-05 DIAGNOSIS — Z98.890 S/P RIGHT KNEE ARTHROSCOPY: Primary | ICD-10-CM

## 2022-04-05 PROCEDURE — 99024 POSTOP FOLLOW-UP VISIT: CPT | Performed by: ORTHOPAEDIC SURGERY

## 2022-04-05 NOTE — PROGRESS NOTES
"Chief Complaint  Pain of the Right Knee     Subjective      Noel Mueller presents to [facility] for evaluation of the right knee. The patient is S/P Right Knee Arthroscopy With Partial Lateral Meniscectomy, And Chondroplasty 3/21/22. His sutures were removed today. He is overall doing well. He has no new complaints. He has been attending physical therapy.     No Known Allergies     Social History     Socioeconomic History   • Marital status:    Tobacco Use   • Smoking status: Never Smoker   • Smokeless tobacco: Never Used   Vaping Use   • Vaping Use: Never used   Substance and Sexual Activity   • Alcohol use: Yes     Alcohol/week: 6.0 standard drinks     Types: 3 Glasses of wine, 3 Cans of beer per week     Comment: Usually have a glass of wine or a beer with dinner   • Drug use: Never   • Sexual activity: Defer        Review of Systems     Objective   Vital Signs:   Ht 165.1 cm (65\")   Wt 77.1 kg (170 lb)   BMI 28.29 kg/m²       Physical Exam  Constitutional:       Appearance: Normal appearance. The patient is well-developed and normal weight.   HENT:      Head: Normocephalic.      Right Ear: Hearing and external ear normal.      Left Ear: Hearing and external ear normal.      Nose: Nose normal.   Eyes:      Conjunctiva/sclera: Conjunctivae normal.   Cardiovascular:      Rate and Rhythm: Normal rate.   Pulmonary:      Effort: Pulmonary effort is normal.      Breath sounds: No wheezing or rales.   Abdominal:      Palpations: Abdomen is soft.      Tenderness: There is no abdominal tenderness.   Musculoskeletal:      Cervical back: Normal range of motion.   Skin:     Findings: No rash.   Neurological:      Mental Status: The patient is alert and oriented to person, place, and time.   Psychiatric:         Mood and Affect: Mood and affect normal.         Judgment: Judgment normal.       Ortho Exam      Right knee- ROM -5 to 135 degrees. Mild swelling. Incisions well healing. " Negative whittington. Neurovascularly intact. Positive EHL, FHL, GS and TA. Sensation intact to all 5 nerves of the foot. Positive pulses. Neurovascularly intact. Stable to varus/valgus stress. Stable to anterior/posterior drawer. Calf soft, non-tender.     Procedures      Imaging Results (Most Recent)     None           Result Review :       MRI Knee Right Without Contrast    Result Date: 3/8/2022  Narrative: PROCEDURE: MRI KNEE RIGHT  WO CONTRAST  COMPARISON: [facility], CR, XR KNEE 1 OR 2 VW RIGHT, 2/09/2022, 12:14.  INDICATIONS: RIGHT ANTERIOLATERAL KNEE PAIN. POST PLAYING RACQUETBALL ~ 3 WEEKS AGO.  TECHNIQUE: A complete multi-planar MRI was performed.   FINDINGS:  SOFT TISSUES:  Small joint effusion with mild diffuse synovial thickening.  Small 3 mm nodular area of synovial thickening in the posterior lateral knee joint space versus a small intra-articular body.  Mild nonspecific edema in the lateral infrapatellar fat pad.  No popliteal cyst.  Mild nonspecific prepatellar soft tissue edema.  No solid soft tissue mass.  MENISCI: The medial meniscus is intact.  Partial-thickness curvilinear radial tear through the lateral meniscus anterior body.  No displaced meniscal fragment.  CRUCIATE LIGAMENTS: The ACL is intact. The PCL is intact.  COLLATERAL LIGAMENTS: The MCL is intact. The LCL complex is intact.  EXTENSOR MECHANISM: The quadriceps tendon is intact. The patellar tendon is intact.  ARTICULAR CARTILAGE:  The articular cartilage is well maintained in all 3 compartments.  BONES:  No fracture.  No concerning bone marrow lesion or marrow replacing process.      Impression:   1. Partial-thickness curvilinear radial tear through the lateral meniscus anterior body.  No displaced meniscal fragment. 2. Small joint effusion with nodular synovial thickening or small intra-articular body in the posterior lateral knee joint space. 3. Mild nonspecific edema in the lateral infrapatellar fat pad, which could be  reactive or reflect fat pad impingement.     GERARD BAZZI MD       Electronically Signed and Approved By: GERARD BAZZI MD on 3/08/2022 at 9:12                      Assessment and Plan     DX: Right Knee Arthroscopy With Partial Lateral Meniscectomy, And Chondroplasty     Discussed the treatment plan with the patient.  Plan to continue physical therapy and working on extension.     Call or return if worsening symptoms.    Follow Up     4 weeks      Patient was given instructions and counseling regarding his condition or for health maintenance advice. Please see specific information pulled into the AVS if appropriate.     Scribed for Dung Petersen MD by Aliya Fagan.  04/05/22   09:20 EDT    I have personally performed the services described in this document as scribed by the above individual and it is both accurate and complete. Dung Petersen MD 04/05/22

## 2022-04-08 ENCOUNTER — TREATMENT (OUTPATIENT)
Dept: PHYSICAL THERAPY | Facility: CLINIC | Age: 52
End: 2022-04-08

## 2022-04-08 DIAGNOSIS — M25.561 RIGHT KNEE PAIN, UNSPECIFIED CHRONICITY: ICD-10-CM

## 2022-04-08 DIAGNOSIS — R26.2 DIFFICULTY WALKING: ICD-10-CM

## 2022-04-08 DIAGNOSIS — Z98.890 S/P ARTHROSCOPIC PARTIAL LATERAL MENISCECTOMY: Primary | ICD-10-CM

## 2022-04-08 DIAGNOSIS — Z47.89 SURGICAL AFTERCARE, MUSCULOSKELETAL SYSTEM: ICD-10-CM

## 2022-04-08 PROCEDURE — 97110 THERAPEUTIC EXERCISES: CPT | Performed by: PHYSICAL THERAPIST

## 2022-04-08 PROCEDURE — 97530 THERAPEUTIC ACTIVITIES: CPT | Performed by: PHYSICAL THERAPIST

## 2022-04-08 NOTE — PROGRESS NOTES
Physical Therapy Daily Progress Note    Patient: Noel Mueller   : 1970  Diagnosis/ICD-10 Code:  S/P arthroscopic partial lateral meniscectomy [Z98.890]  Referring practitioner: Dung Petersen MD  Date of Initial Visit: Type: THERAPY  Noted: 3/24/2022  Today's Date: 2022  Patient seen for 3 sessions           Subjective   The patient reported that he has already rode his bike and gotten on the elliptical today. His knee is improving. He is able to extend his He rates his pain today as a 3/10 prior to starting PT.    Objective   See Exercise, Manual, and Modality Logs for complete treatment.     Assessment/Plan  The patient demonstrated good tolerance to all interventions today. Continue to progress functional lower extremity strengthening per patient tolerance to allow patient to return to running and cycling.       Timed:  Manual Therapy:    0     mins  14489;  Therapeutic Exercise:    20     mins  70534;     Neuromuscular Renu:   0    mins  87174;    Therapeutic Activity:     10     mins  09269;     Gait Trainin     mins  80842;     Aquatics                         0      mins  18007    Un-timed:  Mechanical Traction      0     mins  38762  Dry Needling     0     mins self-pay  Electrical Stimulation:    0     mins  21534 ( );      Timed Treatment:   30   mins   Total Treatment:     36   mins    Otf Ibrahim PT  Physical Therapist    Electronically signed 2022    [place] License: PT - [id number]

## 2022-04-15 ENCOUNTER — TREATMENT (OUTPATIENT)
Dept: PHYSICAL THERAPY | Facility: CLINIC | Age: 52
End: 2022-04-15

## 2022-04-15 DIAGNOSIS — Z47.89 SURGICAL AFTERCARE, MUSCULOSKELETAL SYSTEM: ICD-10-CM

## 2022-04-15 DIAGNOSIS — Z98.890 S/P ARTHROSCOPIC PARTIAL LATERAL MENISCECTOMY: Primary | ICD-10-CM

## 2022-04-15 DIAGNOSIS — R26.2 DIFFICULTY WALKING: ICD-10-CM

## 2022-04-15 DIAGNOSIS — M25.561 RIGHT KNEE PAIN, UNSPECIFIED CHRONICITY: ICD-10-CM

## 2022-04-15 PROCEDURE — 97110 THERAPEUTIC EXERCISES: CPT | Performed by: PHYSICAL THERAPIST

## 2022-04-15 PROCEDURE — 97140 MANUAL THERAPY 1/> REGIONS: CPT | Performed by: PHYSICAL THERAPIST

## 2022-04-15 PROCEDURE — 97530 THERAPEUTIC ACTIVITIES: CPT | Performed by: PHYSICAL THERAPIST

## 2022-04-15 NOTE — PROGRESS NOTES
Physical Therapy Daily Progress Note    Patient: Noel Mueller   : 1970  Diagnosis/ICD-10 Code:  S/P arthroscopic partial lateral meniscectomy [Z98.890]  Referring practitioner: Dung Petersen MD  Date of Initial Visit: Type: THERAPY  Noted: 3/24/2022  Today's Date: 4/15/2022  Patient seen for 4 sessions           Subjective   The patient reported that he is still experiencing some calf tightness and tingling/numbness in his proximal thigh.     Objective   See Exercise, Manual, and Modality Logs for complete treatment.     Assessment/Plan  The patient demonstrated good tolerance to exercise progression today. Continue to progress functional strengthening exercise per patient tolerance.       Timed:  Manual Therapy:    5     mins  94103;  Therapeutic Exercise:    20     mins  25995;     Neuromuscular Renu:   0    mins  07916;    Therapeutic Activity:     13     mins  33393;     Gait Trainin     mins  48028;     Aquatics                         0      mins  12922    Un-timed:  Mechanical Traction      0     mins  97626  Dry Needling     0     mins self-pay  Electrical Stimulation:    0     mins  14134 ( );      Timed Treatment:   38   mins   Total Treatment:     38   mins    Otf Ibrahim PT  Physical Therapist    Electronically signed 4/15/2022    [place] License: PT - [id number]

## 2022-04-17 PROBLEM — N52.9 ERECTILE DYSFUNCTION: Status: ACTIVE | Noted: 2022-04-17

## 2022-04-17 NOTE — PROGRESS NOTES
Chief Complaint: Urologic complaint    Subjective         History of Present Illness  Noel Mueller is a 51 y.o. male     Peyronie's disease  BPH/frequency    Patient for the last couple years been having some constriction of the proximal penis.  No major curvature but does cause his penis to go to the left little bit.    No pain    No trouble with erections or maintaining an erection.    Voiding okay.  Nocturia x1.   No straining.  No urgency.    Minimal caffeine.    9/21 CT abdomen/pelvis with - findings consistent with acute appendicitis    PVR    4/22     29    2/22 1.0, GFR 92     No gross hematuria    No history of kidney  stone.    No urologic family history,   no history of urologic surgery.    No cardiopulmonary history.  Non-smoker.  No anticoagulation    Results for orders placed or performed in visit on 04/19/22   Bladder Scan   Result Value Ref Range    Volume 29ML          Objective     Past Medical History:   Diagnosis Date   • Hyperlipidemia     BORDERLINE- NO MEDS- MANAGE WITH EXERCISE/DIET   • Low back pain 2008   • Tear of lateral meniscus of right knee 2022    CURRENTLY       Past Surgical History:   Procedure Laterality Date   • APPENDECTOMY N/A 09/15/2021    Procedure: APPENDECTOMY LAPAROSCOPIC;  Surgeon: Steven Méndez MD;  Location: [facility] MAIN OR;  Service: General;  Laterality: N/A;   • COLONOSCOPY  12/2019   • KNEE ARTHROSCOPY Right 3/21/2022    Procedure: RIGHT KNEE ARTHROSCOPY WITH PARTIAL LATERAL MENISCECTOMY, AND CHONDROPLASTY;  Surgeon: Dung Petersen MD;  Location: [facility] OR [facility];  Service: Orthopedics;  Laterality: Right;   • SHOULDER ACROMIOPLASTY WITH ROTATOR CUFF REPAIR Bilateral    • VASECTOMY  2015         Current Outpatient Medications:   •  aspirin EC (Ecotrin) 325 MG tablet, Take 1 tablet by mouth Daily., Disp: 14 tablet, Rfl: 0  •  diclofenac (VOLTAREN) 75 MG EC tablet, Take 75 mg by mouth Daily As Needed., Disp: , Rfl:   •  HYDROcodone-acetaminophen (Norco)  7.5-325 MG per tablet, Take 1-2 tablets by mouth Every 4 (Four) Hours As Needed for Moderate Pain ., Disp: 36 tablet, Rfl: 0    No Known Allergies     Family History   Problem Relation Age of Onset   • Leukemia Mother    • Cancer Mother         Leukemia       Social History     Socioeconomic History   • Marital status:    Tobacco Use   • Smoking status: Never Smoker   • Smokeless tobacco: Never Used   Vaping Use   • Vaping Use: Never used   Substance and Sexual Activity   • Alcohol use: Yes     Alcohol/week: 6.0 standard drinks     Types: 3 Glasses of wine, 3 Cans of beer per week     Comment: Usually have a glass of wine or a beer with dinner   • Drug use: Never   • Sexual activity: Defer       Vital Signs:   There were no vitals taken for this visit.     Physical exam    Alert and orient x3  Well appearing, well developed, in no acute distress   Unlabored respirations  Nontender/nondistended    Normal circumcised phallus.  Glandular hypospadias  No Peyronie's plaque palpated today.  B/l descended testicles without mass or tenderness    Grossly oriented to person, place and time, judgment is intact, normal mood and affect    Results for orders placed or performed during the hospital encounter of 03/21/22   ECG 12 Lead   Result Value Ref Range    QT Interval 418 ms             Assessment and Plan        Peyronie's disease    Discussed with patient he has Peyronie's disease.  Discussed because he can still get good erections, has no pain and can have intercourse he does not need any treatment.  No risk of malignant degeneration.  Patient given reassurance.        BPH with frequency    At this time patient is not bothered,  emptying out okay.  We did discuss risk and benefits of Flomax 0.4 mg daily  -  he is not interested.    UA with micro

## 2022-04-19 ENCOUNTER — OFFICE VISIT (OUTPATIENT)
Dept: UROLOGY | Facility: CLINIC | Age: 52
End: 2022-04-19

## 2022-04-19 VITALS — WEIGHT: 170 LBS | BODY MASS INDEX: 28.32 KG/M2 | RESPIRATION RATE: 18 BRPM | HEIGHT: 65 IN

## 2022-04-19 DIAGNOSIS — R35.0 BENIGN PROSTATIC HYPERPLASIA WITH URINARY FREQUENCY: ICD-10-CM

## 2022-04-19 DIAGNOSIS — N52.9 ERECTILE DYSFUNCTION, UNSPECIFIED ERECTILE DYSFUNCTION TYPE: Primary | ICD-10-CM

## 2022-04-19 DIAGNOSIS — N40.1 BENIGN PROSTATIC HYPERPLASIA WITH URINARY FREQUENCY: ICD-10-CM

## 2022-04-19 DIAGNOSIS — R35.0 URINARY FREQUENCY: ICD-10-CM

## 2022-04-19 LAB — SPECIMEN VOL 24H UR: NORMAL L

## 2022-04-19 PROCEDURE — 99203 OFFICE O/P NEW LOW 30 MIN: CPT | Performed by: UROLOGY

## 2022-04-19 PROCEDURE — 51798 US URINE CAPACITY MEASURE: CPT | Performed by: UROLOGY

## 2022-04-22 ENCOUNTER — TREATMENT (OUTPATIENT)
Dept: PHYSICAL THERAPY | Facility: CLINIC | Age: 52
End: 2022-04-22

## 2022-04-22 DIAGNOSIS — R26.2 DIFFICULTY WALKING: ICD-10-CM

## 2022-04-22 DIAGNOSIS — Z98.890 S/P ARTHROSCOPIC PARTIAL LATERAL MENISCECTOMY: Primary | ICD-10-CM

## 2022-04-22 DIAGNOSIS — Z47.89 SURGICAL AFTERCARE, MUSCULOSKELETAL SYSTEM: ICD-10-CM

## 2022-04-22 DIAGNOSIS — M25.561 RIGHT KNEE PAIN, UNSPECIFIED CHRONICITY: ICD-10-CM

## 2022-04-22 PROCEDURE — 97110 THERAPEUTIC EXERCISES: CPT | Performed by: PHYSICAL THERAPIST

## 2022-04-22 PROCEDURE — 97530 THERAPEUTIC ACTIVITIES: CPT | Performed by: PHYSICAL THERAPIST

## 2022-04-22 NOTE — PROGRESS NOTES
Physical Therapy Daily Progress Note    Patient: Noel Mueller   : 1970  Diagnosis/ICD-10 Code:  S/P arthroscopic partial lateral meniscectomy [Z98.890]  Referring practitioner: Dung Petersen MD  Date of Initial Visit: Type: THERAPY  Noted: 3/24/2022  Today's Date: 2022  Patient seen for 5 sessions           Subjective   The patient reported that his pain is minimal today. He rates it as a 1/10. He still experiences some discomfort with deep squatting and with certain yoga poses.     Objective   See Exercise, Manual, and Modality Logs for complete treatment.     Assessment/Plan  The patient continues to demonstrate good tolerance to all functional lower extremity strengthening exercise. He will be re-evaluated next session and transitioned to plyometric exercise at 6 weeks post-op.       Timed:  Manual Therapy:    0     mins  81763;  Therapeutic Exercise:    25     mins  37617;     Neuromuscular Renu:   0    mins  67831;    Therapeutic Activity:     13     mins  32774;     Gait Trainin     mins  26096;     Aquatics                         0      mins  75357    Un-timed:  Mechanical Traction      0     mins  17471  Dry Needling     0     mins self-pay  Electrical Stimulation:    0     mins  76059 ( );      Timed Treatment:   38   mins   Total Treatment:     38   mins    Otf Ibrahim PT  Physical Therapist    Electronically signed 2022    [place] License: PT - [id number]

## 2022-04-29 ENCOUNTER — TREATMENT (OUTPATIENT)
Dept: PHYSICAL THERAPY | Facility: CLINIC | Age: 52
End: 2022-04-29

## 2022-04-29 DIAGNOSIS — R26.2 DIFFICULTY WALKING: ICD-10-CM

## 2022-04-29 DIAGNOSIS — M25.561 RIGHT KNEE PAIN, UNSPECIFIED CHRONICITY: ICD-10-CM

## 2022-04-29 DIAGNOSIS — Z47.89 SURGICAL AFTERCARE, MUSCULOSKELETAL SYSTEM: ICD-10-CM

## 2022-04-29 DIAGNOSIS — Z98.890 S/P ARTHROSCOPIC PARTIAL LATERAL MENISCECTOMY: Primary | ICD-10-CM

## 2022-04-29 PROCEDURE — 97110 THERAPEUTIC EXERCISES: CPT | Performed by: PHYSICAL THERAPIST

## 2022-04-29 PROCEDURE — 97530 THERAPEUTIC ACTIVITIES: CPT | Performed by: PHYSICAL THERAPIST

## 2022-04-29 NOTE — PROGRESS NOTES
Progress Note      Patient: Noel Mueller   : 1970  Diagnosis/ICD-10 Code:  S/P arthroscopic partial lateral meniscectomy [Z98.890]  Referring practitioner: Dung Petersen MD  Date of Initial Visit: Type: THERAPY  Noted: 3/24/2022  Today's Date: 2022  Patient seen for 6 sessions      Subjective:   Subjective Questionnaire: LEFS: 52/80 = 65% Function  Clinical Progress: improved  Home Program Compliance: Yes  Treatment has included: therapeutic exercise and therapeutic activity    Subjective   The patient reported that his knee pain is between a 0-1/10 today. Over the past month, he has noticed improvements in right knee pain, strength, and flexibility. He has returned to cycling on his  for 20 miles. He has not returned to running per surgical restrictions. He would like to continue with physical therapy to allow him to safely return to running.    Objective          Neurological Testing     Sensation     Knee   Left Knee   Intact: light touch    Right Knee   Intact: light touch     Active Range of Motion     Right Knee   Flexion: 132 degrees   Extension: 0 degrees     Passive Range of Motion     Right Knee   Flexion: 135 degrees   Extension: 0 degrees     Patellar Mobility     Right Knee Patellar tendons within functional limits include the medial, lateral, superior and inferior.     Strength/Myotome Testing     Left Hip   Planes of Motion   Flexion: 5  Extension: 5  Abduction: 5    Right Hip   Planes of Motion   Flexion: 4+  Extension: 4+  Abduction: 4+    Left Knee   Flexion: 5  Extension: 5  Quadriceps contraction: good    Right Knee   Flexion: 5  Extension: 4+  Quadriceps contraction: good    Ambulation     Ambulation: Level Surfaces     Additional Level Surfaces Ambulation Details  The patient ambulates with normal biomechanics of gait.      See Exercise, Manual, and Modality Logs for complete treatment.     Assessment & Plan     Assessment    Assessment details: The patient was  re-evaluated today and presents with improvements in right knee pain, strength, ROM, gait, and function (LEFS) compared to his initial evaluation. He has progressed very well with all strengthening interventions. He has returned to cycling, but not running due to surgical restrictions. He will be 6 weeks post-op next week and was told that he can attempt to jog next week to see how his knee feels. He was cautioned to not overdo it with jogging by starting at a reduced pace and with a short distance.    Goals  Plan Goals: KNEE PROBLEMS:     1. The patient has limited ROM of the right knee.              LTG 1: 12 weeks:  The patient will demonstrate 0 to 135 degrees of ROM for the right knee in order to allow patient to ambulate and perform mobility related ADLs.                          STATUS:  Met              STG 1a: 6 weeks:  The patient will demonstrate 0 to 130 degrees of ROM for the right knee.                          STATUS:  Met              TREATMENT: Manual therapy, therapeutic exercise, home exercise instruction, and modalities as needed to include:  moist heat, electrical stimulation, ultrasound, and ice.    2. The patient has limited strength of the right knee.              LTG 2: 12 weeks: The patient will demonstrate 5 /5 strength for right knee flexion and extension in order to allow patient improved joint stability                          STATUS:  Progressing              TREATMENT: Manual therapy, therapeutic exercise, home exercise instruction, aquatic therapy, and modalities as needed to include:  moist heat, electrical stimulation, ultrasound, and ice.                3. The patient has limited strength of the right hip.              LTG 3: 12 weeks: The patient will demonstrate 5 /5 strength for right hip flexion, abduction, and extension in order to allow patient improved joint stability                          STATUS:  Progressing              TREATMENT: Manual therapy, therapeutic  exercise, home exercise instruction, aquatic therapy, and modalities as needed to include:  moist heat, electrical stimulation, ultrasound, and ice.    4. Mobility: Walking/Moving Around Functional Limitation                               LTG 4: 12 weeks:  The patient will demonstrate 0 % limitation by achieving a score of 80/80 on the LEFS.                          STATUS:  Progressing               STG 4 a: 6 weeks:  The patient will demonstrate 25 % limitation by achieving a score of 60/80 on the LEFS.                            STATUS:  Progressing              TREATMENT:  Manual therapy, therapeutic exercise, home exercise instruction, and modalities as needed to include: moist heat, electrical stimulation, and ultrasound.    5. The patient is unable to run.                         LTG 5: 12 weeks:  The patient will be able to run 1 mile in 10 minutes or less with proper biomechanics.                          STATUS:  Progressing              STG 5 a: 6 weeks:  The patient will be able to run 1/2 mile in 5 minutes or less with proper biomechanics.                          STATUS:  Progressing              TREATMENT:  Manual therapy, therapeutic exercise, home exercise instruction, and modalities as needed to include: moist heat, electrical stimulation, and ultrasound.      Progress toward previous goals: Partially Met      Recommendations: Continue as planned  Timeframe: 1 month  Prognosis to achieve goals: good    PT Signature: Otf Ibrahim PT    Electronically signed 2022    [place] License: PT - [id number]     Timed:  Manual Therapy:    0     mins  85592;  Therapeutic Exercise:    25     mins  34214;     Neuromuscular Renu:    0    mins  53655;    Therapeutic Activity:     13     mins  13562;     Gait Trainin     mins  25985;     Aquatics                         0      mins  65400    Un-timed:  Mechanical Traction      0     mins  93595  Dry Needling     0     mins self-pay  Electrical Stimulation:     0     mins  41317 ( );    Timed Treatment:   38   mins   Total Treatment:     38   mins

## 2022-05-04 ENCOUNTER — TREATMENT (OUTPATIENT)
Dept: PHYSICAL THERAPY | Facility: CLINIC | Age: 52
End: 2022-05-04

## 2022-05-04 DIAGNOSIS — M25.561 RIGHT KNEE PAIN, UNSPECIFIED CHRONICITY: ICD-10-CM

## 2022-05-04 DIAGNOSIS — Z98.890 S/P ARTHROSCOPIC PARTIAL LATERAL MENISCECTOMY: Primary | ICD-10-CM

## 2022-05-04 DIAGNOSIS — Z47.89 SURGICAL AFTERCARE, MUSCULOSKELETAL SYSTEM: ICD-10-CM

## 2022-05-04 DIAGNOSIS — R26.2 DIFFICULTY WALKING: ICD-10-CM

## 2022-05-04 PROCEDURE — 97530 THERAPEUTIC ACTIVITIES: CPT | Performed by: PHYSICAL THERAPIST

## 2022-05-04 PROCEDURE — 97110 THERAPEUTIC EXERCISES: CPT | Performed by: PHYSICAL THERAPIST

## 2022-05-04 NOTE — PROGRESS NOTES
Physical Therapy Daily Progress Note    Patient: Noel Mueller   : 1970  Diagnosis/ICD-10 Code:  S/P arthroscopic partial lateral meniscectomy [Z98.890]  Referring practitioner: Dung Petersen MD  Date of Initial Visit: Type: THERAPY  Noted: 3/24/2022  Today's Date: 2022  Patient seen for 7 sessions           Subjective   The patient reported that he has returned to jogging. He has been able to jog a mile several times and didn't experience any significant     Objective   See Exercise, Manual, and Modality Logs for complete treatment.     Assessment/Plan  The patient demonstrated good tolerance to all interventions today. He was able to jog 1/2 mile at 6.0 mph with normal biomechanics. He is progressing very well at this time. He was allowed to increase running distance as long as he isn't experiencing any pain.       Timed:  Manual Therapy:    0     mins  98405;  Therapeutic Exercise:    15     mins  88430;     Neuromuscular Renu:   0    mins  51068;    Therapeutic Activity:     25     mins  05209;     Gait Trainin     mins  12522;     Aquatics                         0      mins  78672    Un-timed:  Mechanical Traction      0     mins  42533  Dry Needling     0     mins self-pay  Electrical Stimulation:    0     mins  89769 ( );      Timed Treatment:   40   mins   Total Treatment:     40   mins    Otf Ibrahim PT  Physical Therapist    Electronically signed 2022    [place] License: PT - [id number]

## 2022-05-05 ENCOUNTER — OFFICE VISIT (OUTPATIENT)
Dept: ORTHOPEDIC SURGERY | Facility: CLINIC | Age: 52
End: 2022-05-05

## 2022-05-05 VITALS — HEIGHT: 65 IN | BODY MASS INDEX: 28.32 KG/M2 | OXYGEN SATURATION: 99 % | HEART RATE: 62 BPM | WEIGHT: 170 LBS

## 2022-05-05 DIAGNOSIS — Z98.890 S/P RIGHT KNEE ARTHROSCOPY: Primary | ICD-10-CM

## 2022-05-05 PROCEDURE — 99024 POSTOP FOLLOW-UP VISIT: CPT | Performed by: PHYSICIAN ASSISTANT

## 2022-05-05 NOTE — PROGRESS NOTES
"Chief Complaint  Follow-up of the Right Knee    Subjective          Noel Mueller is a 51 y.o. male  presents to [facility] for   History of Present Illness      Patient presents for follow-up evaluation of right knee arthroscopic partial lateral meniscectomy, chondroplasty, 3/21/2022.  Patient states he has been doing well he goes to therapy once a week and then he does home exercises.  He is retired .  He states that he is doing \"not too bad \".  He states he has no pain with normal activity he states that he works out 3 days a week doing cardio in 3 days doing weightlifting.  He states that he started running recently physical therapy.  He would like to continue therapy.  He denies new injury or symptoms of pain, denies swelling locking catching or buckling.      No Known Allergies     Social History     Socioeconomic History   • Marital status:    Tobacco Use   • Smoking status: Never Smoker   • Smokeless tobacco: Never Used   Vaping Use   • Vaping Use: Never used   Substance and Sexual Activity   • Alcohol use: Yes     Alcohol/week: 6.0 standard drinks     Types: 3 Glasses of wine, 3 Cans of beer per week     Comment: Usually have a glass of wine or a beer with dinner   • Drug use: Never   • Sexual activity: Defer        REVIEW OF SYSTEMS    Constitutional: Denies fevers, chills, weight loss  Cardiovascular: Denies chest pain, shortness of breath  Skin: Denies rashes, acute skin changes  Neurologic: Denies headache, loss of consciousness  MSK: Right knee pain      Objective   Vital Signs:   Pulse 62   Ht 165.1 cm (65\")   Wt 77.1 kg (170 lb)   SpO2 99%   BMI 28.29 kg/m²     Body mass index is 28.29 kg/m².    Physical Exam    Right knee: Well-healed arthroscopic incisions, no erythema, ecchymosis, no swelling, no effusion, full extension, flexion 130, stable to varus/valgus stress, stable anterior/posterior drawer, 5 out of 5 strength, neurovascular intact, " nontender calf, negative Thais testing.    Procedures    Imaging Results (Most Recent)     None           Result Review :   The following data was reviewed by: NIURKA Llanos on 05/05/2022:               Assessment and Plan    Diagnoses and all orders for this visit:    1. S/P Right Knee Arthroscopy With Partial Lateral Meniscectomy, And Chondroplasty 3/21/22 (Primary)  -     Ambulatory Referral to Physical Therapy Evaluate and treat (2-3x/week for 6-8 weeks)        Discussed diagnosis and treatment options with the patient he will continue physical therapy, new orders written, if any new or concerning symptoms occur follow-up sooner otherwise follow-up in 6 weeks for recheck/possible release.    Call or return if worsening symptoms.    Follow Up   Return in about 6 weeks (around 6/16/2022) for Recheck.  Patient was given instructions and counseling regarding his condition or for health maintenance advice. Please see specific information pulled into the AVS if appropriate.

## 2022-05-13 ENCOUNTER — TREATMENT (OUTPATIENT)
Dept: PHYSICAL THERAPY | Facility: CLINIC | Age: 52
End: 2022-05-13

## 2022-05-13 DIAGNOSIS — M25.561 RIGHT KNEE PAIN, UNSPECIFIED CHRONICITY: ICD-10-CM

## 2022-05-13 DIAGNOSIS — Z47.89 SURGICAL AFTERCARE, MUSCULOSKELETAL SYSTEM: ICD-10-CM

## 2022-05-13 DIAGNOSIS — R26.2 DIFFICULTY WALKING: ICD-10-CM

## 2022-05-13 DIAGNOSIS — Z98.890 S/P ARTHROSCOPIC PARTIAL LATERAL MENISCECTOMY: Primary | ICD-10-CM

## 2022-05-13 PROCEDURE — 97110 THERAPEUTIC EXERCISES: CPT | Performed by: PHYSICAL THERAPIST

## 2022-05-13 PROCEDURE — 97530 THERAPEUTIC ACTIVITIES: CPT | Performed by: PHYSICAL THERAPIST

## 2022-05-13 NOTE — PROGRESS NOTES
Physical Therapy Daily Progress Note    Patient: Noel Mueller   : 1970  Diagnosis/ICD-10 Code:  S/P arthroscopic partial lateral meniscectomy [Z98.890]  Referring practitioner: Dung Petersen MD  Date of Initial Visit: Type: THERAPY  Noted: 3/24/2022  Today's Date: 2022  Patient seen for 8 sessions           Subjective   The patient reported that his knee isn't really hurting today. He went fishing with family in [place] last weekend and noticed some increased swelling afterwards. He hasn't experienced any sharp pains over the past week.    Objective   See Exercise, Manual, and Modality Logs for complete treatment.     Assessment/Plan  The patient demonstrated good tolerance to all functional lower extremity strengthening exercise today. He is progressing very well at this time. Continue with current plan of care.       Timed:  Manual Therapy:    0     mins  57159;  Therapeutic Exercise:    18     mins  55220;     Neuromuscular Renu:   0    mins  22835;    Therapeutic Activity:     20     mins  36428;     Gait Trainin     mins  18590;     Aquatics                         0      mins  94126    Un-timed:  Mechanical Traction      0     mins  28862  Dry Needling     0     mins self-pay  Electrical Stimulation:    0     mins  60671 ( );      Timed Treatment:   38   mins   Total Treatment:     38   mins    Otf Ibrahim PT  Physical Therapist    Electronically signed 2022    [place] License: PT - [id number]

## 2022-05-20 ENCOUNTER — TREATMENT (OUTPATIENT)
Dept: PHYSICAL THERAPY | Facility: CLINIC | Age: 52
End: 2022-05-20

## 2022-05-20 DIAGNOSIS — Z98.890 S/P ARTHROSCOPIC PARTIAL LATERAL MENISCECTOMY: Primary | ICD-10-CM

## 2022-05-20 DIAGNOSIS — M25.561 RIGHT KNEE PAIN, UNSPECIFIED CHRONICITY: ICD-10-CM

## 2022-05-20 DIAGNOSIS — Z47.89 SURGICAL AFTERCARE, MUSCULOSKELETAL SYSTEM: ICD-10-CM

## 2022-05-20 DIAGNOSIS — R26.2 DIFFICULTY WALKING: ICD-10-CM

## 2022-05-20 PROCEDURE — 97110 THERAPEUTIC EXERCISES: CPT | Performed by: PHYSICAL THERAPIST

## 2022-05-20 PROCEDURE — 97530 THERAPEUTIC ACTIVITIES: CPT | Performed by: PHYSICAL THERAPIST

## 2022-05-20 NOTE — PROGRESS NOTES
"   Physical Therapy Daily Progress Note    Patient: Noel Mueller   : 1970  Diagnosis/ICD-10 Code:  S/P arthroscopic partial lateral meniscectomy [Z98.890]  Referring practitioner: Dung Petersen MD  Date of Initial Visit: Type: THERAPY  Noted: 3/24/2022  Today's Date: 2022  Patient seen for 9 sessions           Subjective   The patient reported that he isn't experiencing any sharp pains in his knee. His knee just feels \"tight\" and he experiences some swelling with a return to running. He tried to run 2-3 miles every other day last week and feels like his knee wasn't quite ready for that. He will back it off next week.    Objective   See Exercise, Manual, and Modality Logs for complete treatment.     Assessment/Plan  The patient demonstrated good tolerance to all interventions today. He is still experiencing some swelling with a return to running. He will back off on running over the next week and focus more on stretching to see if that helps. He will follow up with PT for a re-evaluation in 2 weeks.       Timed:  Manual Therapy:    0     mins  40566;  Therapeutic Exercise:    10     mins  61035;     Neuromuscular Renu:   0    mins  56014;    Therapeutic Activity:     20     mins  04948;     Gait Trainin     mins  08635;     Aquatics                         0      mins  61612    Un-timed:  Mechanical Traction      0     mins  73076  Dry Needling     0     mins self-pay  Electrical Stimulation:    0     mins  58576 ( );      Timed Treatment:   30   mins   Total Treatment:     38   mins    Otf Ibrahim PT  Physical Therapist    Electronically signed 2022    [place] License: PT - [id number]                      "

## 2022-06-15 ENCOUNTER — TELEPHONE (OUTPATIENT)
Dept: UROLOGY | Facility: CLINIC | Age: 52
End: 2022-06-15

## 2022-06-15 NOTE — TELEPHONE ENCOUNTER
Spoke to patient regarding outstanding UA order. He says he completely forgot to do that but does not necessarily feel like he needs to do it at this time. I will cancel order. Instructed patient to please call with any further needs. He verbalized understanding. He says he is doing good.

## 2022-09-11 ENCOUNTER — HOSPITAL ENCOUNTER (EMERGENCY)
Age: 52
Discharge: HOME | End: 2022-09-11
Attending: EMERGENCY MEDICINE

## 2022-09-11 ENCOUNTER — APPOINTMENT (OUTPATIENT)
Dept: ULTRASOUND IMAGING | Age: 52
End: 2022-09-11

## 2022-09-11 ENCOUNTER — APPOINTMENT (OUTPATIENT)
Dept: CT IMAGING | Age: 52
End: 2022-09-11

## 2022-09-11 VITALS
HEART RATE: 81 BPM | SYSTOLIC BLOOD PRESSURE: 131 MMHG | WEIGHT: 136.8 LBS | TEMPERATURE: 98.2 F | OXYGEN SATURATION: 99 % | RESPIRATION RATE: 18 BRPM | DIASTOLIC BLOOD PRESSURE: 77 MMHG | BODY MASS INDEX: 22.08 KG/M2

## 2022-09-11 DIAGNOSIS — R10.13 EPIGASTRIC PAIN: ICD-10-CM

## 2022-09-11 DIAGNOSIS — K21.00 GASTROESOPHAGEAL REFLUX DISEASE WITH ESOPHAGITIS WITHOUT HEMORRHAGE: Primary | ICD-10-CM

## 2022-09-11 DIAGNOSIS — E87.6 HYPOKALEMIA: ICD-10-CM

## 2022-09-11 DIAGNOSIS — R79.89 ELEVATED LFTS: ICD-10-CM

## 2022-09-11 LAB
ALBUMIN SERPL-MCNC: 3.8 G/DL (ref 3.6–5.1)
ALBUMIN SERPL-MCNC: 4.3 G/DL (ref 3.6–5.1)
ALBUMIN/GLOB SERPL: 0.9 {RATIO} (ref 1–2.4)
ALBUMIN/GLOB SERPL: 1 {RATIO} (ref 1–2.4)
ALP SERPL-CCNC: 45 UNITS/L (ref 45–117)
ALP SERPL-CCNC: 50 UNITS/L (ref 45–117)
ALT SERPL-CCNC: 459 UNITS/L
ALT SERPL-CCNC: 461 UNITS/L
ANION GAP BLD CALC-SCNC: 19 MMOL/L (ref 7–19)
ANION GAP SERPL CALC-SCNC: 12 MMOL/L (ref 7–19)
ANION GAP SERPL CALC-SCNC: 14 MMOL/L (ref 7–19)
AST SERPL-CCNC: 588 UNITS/L
AST SERPL-CCNC: 659 UNITS/L
BASOPHILS # BLD: 0.1 K/MCL (ref 0–0.3)
BASOPHILS NFR BLD: 1 %
BILIRUB SERPL-MCNC: 1.6 MG/DL (ref 0.2–1)
BILIRUB SERPL-MCNC: 2 MG/DL (ref 0.2–1)
BUN BLD-MCNC: 14 MG/DL (ref 6–20)
BUN SERPL-MCNC: 12 MG/DL (ref 6–20)
BUN SERPL-MCNC: 13 MG/DL (ref 6–20)
BUN/CREAT SERPL: 14 (ref 7–25)
BUN/CREAT SERPL: 18 (ref 7–25)
CA-I BLD-SCNC: 1.16 MMOL/L (ref 1.15–1.29)
CALCIUM SERPL-MCNC: 10 MG/DL (ref 8.4–10.2)
CALCIUM SERPL-MCNC: 10.7 MG/DL (ref 8.4–10.2)
CHLORIDE BLD-SCNC: 92 MMOL/L (ref 97–110)
CHLORIDE SERPL-SCNC: 94 MMOL/L (ref 97–110)
CHLORIDE SERPL-SCNC: 97 MMOL/L (ref 97–110)
CO2 BLD-SCNC: 35 MMOL/L (ref 19–24)
CO2 SERPL-SCNC: 33 MMOL/L (ref 21–32)
CO2 SERPL-SCNC: 38 MMOL/L (ref 21–32)
CREAT SERPL-MCNC: 0.73 MG/DL (ref 0.67–1.17)
CREAT SERPL-MCNC: 0.83 MG/DL (ref 0.67–1.17)
CREAT SERPL-MCNC: 0.9 MG/DL (ref 0.67–1.17)
DEPRECATED RDW RBC: 47.6 FL (ref 39–50)
EOSINOPHIL # BLD: 0.1 K/MCL (ref 0–0.5)
EOSINOPHIL NFR BLD: 1 %
ERYTHROCYTE [DISTWIDTH] IN BLOOD: 13 % (ref 11–15)
FASTING DURATION TIME PATIENT: ABNORMAL H
FASTING DURATION TIME PATIENT: ABNORMAL H
GFR SERPLBLD BASED ON 1.73 SQ M-ARVRAT: >90 ML/MIN
GLOBULIN SER-MCNC: 3.8 G/DL (ref 2–4)
GLOBULIN SER-MCNC: 4.6 G/DL (ref 2–4)
GLUCOSE BLD-MCNC: 119 MG/DL (ref 70–99)
GLUCOSE SERPL-MCNC: 102 MG/DL (ref 70–99)
GLUCOSE SERPL-MCNC: 120 MG/DL (ref 70–99)
HCT VFR BLD CALC: 49.9 % (ref 39–51)
HCT VFR BLD CALC: 54 % (ref 39–51)
HGB BLD CALC-MCNC: 18.4 G/DL (ref 13–17)
HGB BLD-MCNC: 17.4 G/DL (ref 13–17)
IMM GRANULOCYTES # BLD AUTO: 0 K/MCL (ref 0–0.2)
IMM GRANULOCYTES # BLD: 0 %
INR PPP: 1.3
LIPASE SERPL-CCNC: <50 UNITS/L (ref 73–393)
LYMPHOCYTES # BLD: 0.8 K/MCL (ref 1–4)
LYMPHOCYTES NFR BLD: 10 %
MCH RBC QN AUTO: 34.5 PG (ref 26–34)
MCHC RBC AUTO-ENTMCNC: 34.9 G/DL (ref 32–36.5)
MCV RBC AUTO: 98.8 FL (ref 78–100)
MONOCYTES # BLD: 0.5 K/MCL (ref 0.3–0.9)
MONOCYTES NFR BLD: 7 %
NEUTROPHILS # BLD: 6.2 K/MCL (ref 1.8–7.7)
NEUTROPHILS NFR BLD: 81 %
NRBC BLD MANUAL-RTO: 0 /100 WBC
PLATELET # BLD AUTO: 348 K/MCL (ref 140–450)
POTASSIUM BLD-SCNC: 3.5 MMOL/L (ref 3.4–5.1)
POTASSIUM SERPL-SCNC: 3 MMOL/L (ref 3.4–5.1)
POTASSIUM SERPL-SCNC: 3.7 MMOL/L (ref 3.4–5.1)
PROT SERPL-MCNC: 7.6 G/DL (ref 6.4–8.2)
PROT SERPL-MCNC: 8.9 G/DL (ref 6.4–8.2)
PROTHROMBIN TIME: 13.7 SEC (ref 9.7–11.8)
RAINBOW EXTRA TUBES HOLD SPECIMEN: NORMAL
RBC # BLD: 5.05 MIL/MCL (ref 4.5–5.9)
SODIUM BLDC-SCNC: 141 MMOL/L (ref 135–145)
SODIUM SERPL-SCNC: 139 MMOL/L (ref 135–145)
SODIUM SERPL-SCNC: 142 MMOL/L (ref 135–145)
TROPONIN I SERPL DL<=0.01 NG/ML-MCNC: 6 NG/L
WBC # BLD: 7.6 K/MCL (ref 4.2–11)

## 2022-09-11 PROCEDURE — 10002805 HB CONTRAST AGENT: Performed by: PHYSICIAN ASSISTANT

## 2022-09-11 PROCEDURE — 10004157 US LIVER / GALLBLADDER / PANCREAS

## 2022-09-11 PROCEDURE — 85610 PROTHROMBIN TIME: CPT | Performed by: PHYSICIAN ASSISTANT

## 2022-09-11 PROCEDURE — G1004 CDSM NDSC: HCPCS

## 2022-09-11 PROCEDURE — 96375 TX/PRO/DX INJ NEW DRUG ADDON: CPT

## 2022-09-11 PROCEDURE — 36415 COLL VENOUS BLD VENIPUNCTURE: CPT

## 2022-09-11 PROCEDURE — 10002801 HB RX 250 W/O HCPCS: Performed by: PHYSICIAN ASSISTANT

## 2022-09-11 PROCEDURE — 99284 EMERGENCY DEPT VISIT MOD MDM: CPT

## 2022-09-11 PROCEDURE — 96361 HYDRATE IV INFUSION ADD-ON: CPT

## 2022-09-11 PROCEDURE — 83690 ASSAY OF LIPASE: CPT | Performed by: PHYSICIAN ASSISTANT

## 2022-09-11 PROCEDURE — 10002803 HB RX 637: Performed by: PHYSICIAN ASSISTANT

## 2022-09-11 PROCEDURE — 93010 ELECTROCARDIOGRAM REPORT: CPT | Performed by: EMERGENCY MEDICINE

## 2022-09-11 PROCEDURE — 80053 COMPREHEN METABOLIC PANEL: CPT | Performed by: PHYSICIAN ASSISTANT

## 2022-09-11 PROCEDURE — 84484 ASSAY OF TROPONIN QUANT: CPT | Performed by: PHYSICIAN ASSISTANT

## 2022-09-11 PROCEDURE — 96374 THER/PROPH/DIAG INJ IV PUSH: CPT

## 2022-09-11 PROCEDURE — 74177 CT ABD & PELVIS W/CONTRAST: CPT

## 2022-09-11 PROCEDURE — 93005 ELECTROCARDIOGRAM TRACING: CPT | Performed by: PHYSICIAN ASSISTANT

## 2022-09-11 PROCEDURE — 10002800 HB RX 250 W HCPCS: Performed by: PHYSICIAN ASSISTANT

## 2022-09-11 PROCEDURE — G1004 CDSM NDSC: HCPCS | Performed by: RADIOLOGY

## 2022-09-11 PROCEDURE — 74177 CT ABD & PELVIS W/CONTRAST: CPT | Performed by: RADIOLOGY

## 2022-09-11 PROCEDURE — 99285 EMERGENCY DEPT VISIT HI MDM: CPT | Performed by: PHYSICIAN ASSISTANT

## 2022-09-11 PROCEDURE — 10002807 HB RX 258: Performed by: PHYSICIAN ASSISTANT

## 2022-09-11 PROCEDURE — 85025 COMPLETE CBC W/AUTO DIFF WBC: CPT | Performed by: PHYSICIAN ASSISTANT

## 2022-09-11 PROCEDURE — 80047 BASIC METABLC PNL IONIZED CA: CPT

## 2022-09-11 PROCEDURE — 76705 ECHO EXAM OF ABDOMEN: CPT | Performed by: RADIOLOGY

## 2022-09-11 RX ORDER — FAMOTIDINE 20 MG/1
20 TABLET, FILM COATED ORAL ONCE
Status: COMPLETED | OUTPATIENT
Start: 2022-09-11 | End: 2022-09-11

## 2022-09-11 RX ORDER — POTASSIUM CHLORIDE 20 MEQ/1
20 TABLET, EXTENDED RELEASE ORAL 2 TIMES DAILY
Qty: 20 TABLET | Refills: 0 | Status: SHIPPED | OUTPATIENT
Start: 2022-09-11

## 2022-09-11 RX ORDER — FAMOTIDINE 10 MG/ML
20 INJECTION, SOLUTION INTRAVENOUS ONCE
Status: COMPLETED | OUTPATIENT
Start: 2022-09-11 | End: 2022-09-11

## 2022-09-11 RX ADMIN — FAMOTIDINE 20 MG: 10 INJECTION INTRAVENOUS at 15:12

## 2022-09-11 RX ADMIN — FAMOTIDINE 20 MG: 20 TABLET, FILM COATED ORAL at 17:52

## 2022-09-11 RX ADMIN — IOHEXOL 100 ML: 350 INJECTION, SOLUTION INTRAVENOUS at 13:45

## 2022-09-11 RX ADMIN — SODIUM CHLORIDE, POTASSIUM CHLORIDE, SODIUM LACTATE AND CALCIUM CHLORIDE 1000 ML: 600; 310; 30; 20 INJECTION, SOLUTION INTRAVENOUS at 13:01

## 2022-09-11 RX ADMIN — MORPHINE SULFATE 4 MG: 4 INJECTION INTRAVENOUS at 13:02

## 2022-09-11 ASSESSMENT — PAIN DESCRIPTION - PAIN TYPE
TYPE: ACUTE PAIN
TYPE: ACUTE PAIN

## 2022-09-11 ASSESSMENT — PAIN SCALES - GENERAL
PAINLEVEL_OUTOF10: 7
PAINLEVEL_OUTOF10: 4
PAINLEVEL_OUTOF10: 3
PAINLEVEL_OUTOF10: 9

## 2022-09-12 LAB
ATRIAL RATE (BPM): 85
P AXIS (DEGREES): 61
PR-INTERVAL (MSEC): 120
QRS-INTERVAL (MSEC): 88
QT-INTERVAL (MSEC): 404
QTC: 480
R AXIS (DEGREES): 86
REPORT TEXT: NORMAL
T AXIS (DEGREES): 78
VENTRICULAR RATE EKG/MIN (BPM): 85

## 2022-10-20 ENCOUNTER — HOSPITAL ENCOUNTER (EMERGENCY)
Age: 52
Discharge: HOME | End: 2022-10-21

## 2022-10-20 VITALS
HEIGHT: 65 IN | BODY MASS INDEX: 24.17 KG/M2 | DIASTOLIC BLOOD PRESSURE: 90 MMHG | RESPIRATION RATE: 16 BRPM | SYSTOLIC BLOOD PRESSURE: 150 MMHG | TEMPERATURE: 98.2 F | HEART RATE: 98 BPM | OXYGEN SATURATION: 98 % | WEIGHT: 145.06 LBS

## 2022-10-20 DIAGNOSIS — K08.89 PAIN, DENTAL: Primary | ICD-10-CM

## 2022-10-20 PROCEDURE — 99284 EMERGENCY DEPT VISIT MOD MDM: CPT | Performed by: NURSE PRACTITIONER

## 2022-10-20 PROCEDURE — 10002803 HB RX 637: Performed by: EMERGENCY MEDICINE

## 2022-10-20 PROCEDURE — 10002803 HB RX 637: Performed by: NURSE PRACTITIONER

## 2022-10-20 PROCEDURE — 99282 EMERGENCY DEPT VISIT SF MDM: CPT

## 2022-10-20 RX ORDER — IBUPROFEN 600 MG/1
600 TABLET ORAL ONCE
Status: COMPLETED | OUTPATIENT
Start: 2022-10-20 | End: 2022-10-20

## 2022-10-20 RX ORDER — PENICILLIN V POTASSIUM 500 MG/1
500 TABLET ORAL 4 TIMES DAILY
Qty: 28 TABLET | Refills: 0 | Status: SHIPPED | OUTPATIENT
Start: 2022-10-20 | End: 2022-10-27

## 2022-10-20 RX ORDER — PENICILLIN V POTASSIUM 250 MG/1
500 TABLET ORAL ONCE
Status: COMPLETED | OUTPATIENT
Start: 2022-10-20 | End: 2022-10-20

## 2022-10-20 RX ADMIN — IBUPROFEN 600 MG: 600 TABLET ORAL at 20:00

## 2022-10-20 RX ADMIN — PENICILLIN V POTASSIUM 500 MG: 250 TABLET, FILM COATED ORAL at 23:59

## 2022-10-20 ASSESSMENT — PAIN SCALES - GENERAL: PAINLEVEL_OUTOF10: 8

## 2023-07-24 ENCOUNTER — TREATMENT (OUTPATIENT)
Dept: PHYSICAL THERAPY | Facility: CLINIC | Age: 53
End: 2023-07-24
Payer: OTHER GOVERNMENT

## 2023-07-24 DIAGNOSIS — R29.898 WEAKNESS OF RIGHT UPPER EXTREMITY: ICD-10-CM

## 2023-07-24 DIAGNOSIS — M25.611 DECREASED RANGE OF MOTION OF RIGHT SHOULDER: ICD-10-CM

## 2023-07-24 DIAGNOSIS — M25.511 RIGHT SHOULDER PAIN, UNSPECIFIED CHRONICITY: Primary | ICD-10-CM

## 2023-07-24 PROCEDURE — 97530 THERAPEUTIC ACTIVITIES: CPT | Performed by: PHYSICAL THERAPIST

## 2023-07-24 PROCEDURE — 97112 NEUROMUSCULAR REEDUCATION: CPT | Performed by: PHYSICAL THERAPIST

## 2023-07-24 PROCEDURE — 20560 NDL INSJ W/O NJX 1 OR 2 MUSC: CPT | Performed by: PHYSICAL THERAPIST

## 2023-07-24 NOTE — PROGRESS NOTES
Physical Therapy Daily Treatment Note  [address] [place] [address]    Patient: Noel Mueller   : 1970  Diagnosis/ICD-10 Code:  Right shoulder pain, unspecified chronicity [M25.511]  Referring practitioner: Provider Not In System  Date of Initial Visit: Type: THERAPY  Noted: 7/3/2023  Today's Date: 2023  Patient seen for 5 sessions           Subjective : Patient reports he is still having pain with reaching up over head, right on top of the shoulder. He is willing to try dry needling to see if its beneficial.    Objective   See Exercise, Manual, and Modality Logs for complete treatment.       Assessment/Plan : Pt tolerated today's session well. Pt gives written and verbal consent with description of procedure and review of risks, benefits, precautions, contraindications, and potential side effects. Education provided for post needling soreness and expectations. All dry needling performed to appropriate depth with bony backdrop, or threading to avoid any anatomic structures that are contraindicated. No adverse events noted. LTR observed with all muscles needled today, pt demonstrates improved flexion and abduction with less pain post needling. Scapular retraining performed post needling for sustained improvements and mechanical corrections with overhead motion. Pt would benefit from continued skilled therapy to address deficits. Progress per plan of care.             Timed:  Manual Therapy:    0     mins  89853;  Therapeutic Exercise:    0     mins  96041;     Neuromuscular Renu:    8    mins  60373;    Therapeutic Activity:     15     mins  70376;     Gait Trainin     mins  09701;     Ultrasound:     0     mins  30175;    Aquatic Therapy    0     mins  42054;    Untimed:  Electrical Stimulation:    0     mins  89521 ( );  Dry Needling     8     mins self-pay; Trial today  Mechanical Traction    0     mins  58070  Moist Heat     0     mins  No charge  [facility] Repos               0     mins 73166    Timed Treatment:   23   mins   Total Treatment:     31   mins    Austin Lo PT  Physical Therapist    Electronically signed 7/24/2023    [place] License: PT - [id number]

## 2023-07-26 ENCOUNTER — TREATMENT (OUTPATIENT)
Dept: PHYSICAL THERAPY | Facility: CLINIC | Age: 53
End: 2023-07-26
Payer: OTHER GOVERNMENT

## 2023-07-26 DIAGNOSIS — M25.611 DECREASED RANGE OF MOTION OF RIGHT SHOULDER: ICD-10-CM

## 2023-07-26 DIAGNOSIS — R29.898 WEAKNESS OF RIGHT UPPER EXTREMITY: ICD-10-CM

## 2023-07-26 DIAGNOSIS — M25.511 RIGHT SHOULDER PAIN, UNSPECIFIED CHRONICITY: Primary | ICD-10-CM

## 2023-07-26 PROCEDURE — 97530 THERAPEUTIC ACTIVITIES: CPT | Performed by: PHYSICAL THERAPIST

## 2023-07-26 PROCEDURE — 97110 THERAPEUTIC EXERCISES: CPT | Performed by: PHYSICAL THERAPIST

## 2023-07-26 NOTE — PROGRESS NOTES
"Physical Therapy Daily Treatment Note  [place] [facility] [address] [place] [address]    Patient: Noel Mueller   : 1970  Referring practitioner: Provider Not In System  Date of Initial Visit: Type: THERAPY  Noted: 7/3/2023  Today's Date: 2023  Patient seen for 6 sessions           Subjective  Noel Mueller reports: he didn't really feel soreness after having dry needling performed last visit. Vikas commented that he did notice less discomfort for about a day. He also stated \"when I concentrate on the scapular retraction I can move free with no pain.\"       Objective   See Exercise, Manual, and Modality Logs for complete treatment.       Assessment/Plan  Program to day to educate in and perform all 6 [place] shoulder/scapular exercises. Vikas stated he felt more in his forearms with the exercises, \"I am not used to rotating so much with exercises so...\" We can always revisit dry needling but at this time our concentration will be on further scapular strengthening. This is due to B protracted scapular position creating   Faulty biomechanics.    Visit Diagnoses:    ICD-10-CM ICD-9-CM   1. Right shoulder pain, unspecified chronicity  M25.511 719.41   2. Decreased range of motion of right shoulder  M25.611 719.51   3. Weakness of right upper extremity  R29.898 729.89       Progress per Plan of Care and Progress strengthening /stabilization /functional activity           Timed:  Manual Therapy:         mins  75125;  Therapeutic Exercise:   10      mins  27384;     Neuromuscular Renu:        mins  82334;    Therapeutic Activity:     14     mins  24613;     Gait Training:           mins  65931;     Ultrasound:          mins  35606;    Electrical Stimulation:         mins  76231 ( );  Aquatics  __   mins   90092    Untimed:  Electrical Stimulation:         mins  86587 ( );  Mechanical Traction:         mins  35961;     Timed Treatment:   24   mins   Total Treatment:     24   " mins    Electronically Signed:  Janey Ramires PTA  Physical Therapist Assistant    [place] PTA license [id number]

## 2023-07-31 ENCOUNTER — TREATMENT (OUTPATIENT)
Dept: PHYSICAL THERAPY | Facility: CLINIC | Age: 53
End: 2023-07-31
Payer: OTHER GOVERNMENT

## 2023-07-31 DIAGNOSIS — R29.898 WEAKNESS OF RIGHT UPPER EXTREMITY: ICD-10-CM

## 2023-07-31 DIAGNOSIS — M25.511 RIGHT SHOULDER PAIN, UNSPECIFIED CHRONICITY: Primary | ICD-10-CM

## 2023-07-31 DIAGNOSIS — M25.611 DECREASED RANGE OF MOTION OF RIGHT SHOULDER: ICD-10-CM

## 2023-07-31 PROCEDURE — 97110 THERAPEUTIC EXERCISES: CPT | Performed by: PHYSICAL THERAPIST

## 2023-07-31 PROCEDURE — 97530 THERAPEUTIC ACTIVITIES: CPT | Performed by: PHYSICAL THERAPIST

## 2023-08-07 ENCOUNTER — TREATMENT (OUTPATIENT)
Dept: PHYSICAL THERAPY | Facility: CLINIC | Age: 53
End: 2023-08-07
Payer: OTHER GOVERNMENT

## 2023-08-07 DIAGNOSIS — R29.898 WEAKNESS OF RIGHT UPPER EXTREMITY: ICD-10-CM

## 2023-08-07 DIAGNOSIS — M25.511 RIGHT SHOULDER PAIN, UNSPECIFIED CHRONICITY: Primary | ICD-10-CM

## 2023-08-07 DIAGNOSIS — M25.611 DECREASED RANGE OF MOTION OF RIGHT SHOULDER: ICD-10-CM

## 2023-08-07 PROCEDURE — 97112 NEUROMUSCULAR REEDUCATION: CPT | Performed by: PHYSICAL THERAPIST

## 2023-08-07 PROCEDURE — 97530 THERAPEUTIC ACTIVITIES: CPT | Performed by: PHYSICAL THERAPIST

## 2023-08-07 PROCEDURE — 97110 THERAPEUTIC EXERCISES: CPT | Performed by: PHYSICAL THERAPIST

## 2023-08-07 NOTE — PROGRESS NOTES
Physical Therapy Daily Treatment Note  [address] [place] [address]    Patient: Noel Mueller   : 1970  Diagnosis/ICD-10 Code:  Right shoulder pain, unspecified chronicity [M25.511]  Referring practitioner: Provider Not In System  Date of Initial Visit: Type: THERAPY  Noted: 7/3/2023  Today's Date: 2023  Patient seen for 8 sessions           Subjective : Patient reports he is doing well today, he still has a pain when reaching overhead. Pain 2/10.    Objective   See Exercise, Manual, and Modality Logs for complete treatment.       Assessment/Plan : Pt tolerated today's session well. Progressed with endurance and strengthening for the right shoulder today with good tolerance. Mixed up weight bearing and stability exercises. Pt would benefit from continued skilled therapy to address deficits. Progress per plan of care.             Timed:  Manual Therapy:    0     mins  05143;  Therapeutic Exercise:    14     mins  21636;     Neuromuscular Renu:    8    mins  99553;    Therapeutic Activity:     16     mins  90409;     Gait Trainin     mins  27989;     Ultrasound:     0     mins  92141;    Aquatic Therapy    0     mins  62177;      Timed Treatment:   38   mins   Total Treatment:     38   mins    Austin Lo PT  Physical Therapist    Electronically signed 2023    [place] License: PT - [id number]

## 2023-08-16 ENCOUNTER — TREATMENT (OUTPATIENT)
Dept: PHYSICAL THERAPY | Facility: CLINIC | Age: 53
End: 2023-08-16
Payer: OTHER GOVERNMENT

## 2023-08-16 DIAGNOSIS — M25.511 RIGHT SHOULDER PAIN, UNSPECIFIED CHRONICITY: Primary | ICD-10-CM

## 2023-08-16 DIAGNOSIS — M25.611 DECREASED RANGE OF MOTION OF RIGHT SHOULDER: ICD-10-CM

## 2023-08-16 DIAGNOSIS — R29.898 WEAKNESS OF RIGHT UPPER EXTREMITY: ICD-10-CM

## 2023-08-16 PROCEDURE — 97110 THERAPEUTIC EXERCISES: CPT | Performed by: PHYSICAL THERAPIST

## 2023-08-16 PROCEDURE — 97530 THERAPEUTIC ACTIVITIES: CPT | Performed by: PHYSICAL THERAPIST

## 2023-08-16 NOTE — PROGRESS NOTES
Physical Therapy Daily Treatment Note  [place] [facility] [address] [place] [address]    Patient: Noel Mueller   : 1970  Referring practitioner: Provider Not In System  Date of Initial Visit: Type: THERAPY  Noted: 7/3/2023  Today's Date: 2023  Patient seen for 9 sessions           Subjective  Noel Mueller reports: he is more aware of posture/retracting his scapula and this has reduced intensity of pain when it does occur at end range motion R shoulder. Vikas advised he had already been to the gym this morning.       Objective   See Exercise, Manual, and Modality Logs for complete treatment.       Assessment/Plan  Vikas advised that the knowledge we gave him has been of the most benefit. He is to continue with his HEP and self workouts. All goals achieved satisfactorily. Discharge at this time.    Visit Diagnoses:    ICD-10-CM ICD-9-CM   1. Right shoulder pain, unspecified chronicity  M25.511 719.41   2. Decreased range of motion of right shoulder  M25.611 719.51   3. Weakness of right upper extremity  R29.898 729.89                  Timed:  Manual Therapy:         mins  53129;  Therapeutic Exercise:    12     mins  06845;     Neuromuscular Renu:        mins  64091;    Therapeutic Activity:     12     mins  70435;     Gait Training:           mins  65576;     Ultrasound:          mins  01528;    Electrical Stimulation:         mins  79728 ( );  Aquatics  __   mins   56633    Untimed:  Electrical Stimulation:         mins  09563 ( );  Mechanical Traction:         mins  13088;     Timed Treatment:   24   mins   Total Treatment:     24   mins    Electronically Signed:  Janey Ramires PTA  Physical Therapist Assistant    [place] PTA license [id number]

## 2023-12-29 ENCOUNTER — HOSPITAL ENCOUNTER (OUTPATIENT)
Dept: GENERAL RADIOLOGY | Age: 53
End: 2023-12-29
Attending: ORTHOPAEDIC SURGERY

## 2023-12-29 DIAGNOSIS — M25.561 PAIN IN BOTH KNEES, UNSPECIFIED CHRONICITY: ICD-10-CM

## 2023-12-29 DIAGNOSIS — M25.562 PAIN IN BOTH KNEES, UNSPECIFIED CHRONICITY: ICD-10-CM

## 2023-12-29 DIAGNOSIS — M25.569 KNEE PAIN: ICD-10-CM

## 2023-12-29 PROCEDURE — 73560 X-RAY EXAM OF KNEE 1 OR 2: CPT | Performed by: RADIOLOGY

## 2023-12-29 PROCEDURE — 73560 X-RAY EXAM OF KNEE 1 OR 2: CPT

## 2024-09-25 ENCOUNTER — DOCUMENTATION (OUTPATIENT)
Dept: PHYSICAL THERAPY | Facility: CLINIC | Age: 54
End: 2024-09-25
Payer: OTHER GOVERNMENT

## (undated) DEVICE — TISSUE RETRIEVAL SYSTEM: Brand: INZII RETRIEVAL SYSTEM

## (undated) DEVICE — BNDG ELAS ECON W/CLIP 6IN 5YD LF STRL

## (undated) DEVICE — DRAIN JACKSON PRATT 10MM 3/4PR: Brand: CARDINAL HEALTH

## (undated) DEVICE — BNDG ESMARK STRL 6INX12FT LF

## (undated) DEVICE — SUT ETHILON 3/0 30IN BLK

## (undated) DEVICE — GLV SURG SENSICARE PI ORTHO SZ8 LF STRL

## (undated) DEVICE — ENDOPATH XCEL WITH OPTIVIEW TECHNOLOGY BLADELESS TROCARS WITH STABILITY SLEEVES: Brand: ENDOPATH XCEL OPTIVIEW

## (undated) DEVICE — KNEE ARTHROSCOPY-LF: Brand: MEDLINE INDUSTRIES, INC.

## (undated) DEVICE — INTENDED FOR TISSUE SEPARATION, AND OTHER PROCEDURES THAT REQUIRE A SHARP SURGICAL BLADE TO PUNCTURE OR CUT.: Brand: BARD-PARKER ® CARBON RIB-BACK BLADES

## (undated) DEVICE — STERILE POLYISOPRENE POWDER-FREE SURGICAL GLOVES WITH EMOLLIENT COATING: Brand: PROTEXIS

## (undated) DEVICE — SYS CLOSE PORTII CARTR/THOMASN XL

## (undated) DEVICE — FMS FLUID MANAGEMENT SYSTEM OUTFLOW TUBING WITHOUT ONE-WAY VALVE (FMS VUE OR FMS DUO PLUS): Brand: FMS

## (undated) DEVICE — ENDOPATH ETS-FLEX45 ARTICULATING ENDOSCOPIC LINEAR CUTTER, NO RELOAD: Brand: ENDOPATH

## (undated) DEVICE — VISUALIZATION SYSTEM: Brand: CLEARIFY

## (undated) DEVICE — GAUZE,SPONGE,4"X4",16PLY,STRL,LF,10/TRAY: Brand: MEDLINE

## (undated) DEVICE — GLV SURG SENSICARE SLT PF LF 7.5 STRL

## (undated) DEVICE — SUT ETHLN 3-0 FS118IN 663H

## (undated) DEVICE — DRSNG GZ PETROLTM XEROFORM CURAD 1X8IN STRL

## (undated) DEVICE — SOL IRR NACL 0.9PCT 3000ML

## (undated) DEVICE — 3M™ STERI-STRIP™ REINFORCED ADHESIVE SKIN CLOSURES, R1547, 1/2 IN X 4 IN (12 MM X 100 MM), 6 STRIPS/ENVELOPE: Brand: 3M™ STERI-STRIP™

## (undated) DEVICE — ENDOPATH PNEUMONEEDLE INSUFFLATION NEEDLES WITH LUER LOCK CONNECTORS 120MM: Brand: ENDOPATH

## (undated) DEVICE — BLUNT TIP LAPAROSCOPIC SEALER/DIVIDER NANO-COATED: Brand: LIGASURE

## (undated) DEVICE — APPL CHLORAPREP HI/LITE 26ML ORNG

## (undated) DEVICE — GOWN,REINFRCE,POLY,SIRUS,BREATH SLV,XXLG: Brand: MEDLINE

## (undated) DEVICE — LAPAROSCOPIC SCISSORS: Brand: EPIX LAPAROSCOPIC SCISSORS

## (undated) DEVICE — SUT MERSILENE POLYSTR CT1 BR 0 75CM GRN

## (undated) DEVICE — BLD CUT FORMLA AGGR PLS 5.0MM

## (undated) DEVICE — GENERAL LAPAROSCOPY-LF: Brand: MEDLINE INDUSTRIES, INC.

## (undated) DEVICE — JACKSON-PRATT 100CC BULB RESERVOIR: Brand: CARDINAL HEALTH

## (undated) DEVICE — ENDOPATH XCEL WITH OPTIVIEW TECHNOLOGY UNIVERSAL TROCAR STABILITY SLEEVES: Brand: ENDOPATH XCEL OPTIVIEW

## (undated) DEVICE — 2, DISPOSABLE SUCTION/IRRIGATOR WITHOUT DISPOSABLE TIP: Brand: STRYKEFLOW

## (undated) DEVICE — NON-WOVEN ADHESIVE WOUND DRESSING: Brand: PRIMAPORE ADHESIVE WOUND DRSG 7.2*5CM

## (undated) DEVICE — ANTIBACTERIAL UNDYED BRAIDED (POLYGLACTIN 910), SYNTHETIC ABSORBABLE SUTURE: Brand: COATED VICRYL

## (undated) DEVICE — SLV SCD LEG COMFORT KENDALLSCD MD REPROC

## (undated) DEVICE — FMS FLUID MANAGEMENT SYSTEM INFLOW TUBING (FMS VUE): Brand: FMS